# Patient Record
Sex: FEMALE | Race: WHITE | NOT HISPANIC OR LATINO | URBAN - METROPOLITAN AREA
[De-identification: names, ages, dates, MRNs, and addresses within clinical notes are randomized per-mention and may not be internally consistent; named-entity substitution may affect disease eponyms.]

---

## 2020-02-01 ENCOUNTER — EMERGENCY (EMERGENCY)
Facility: HOSPITAL | Age: 85
LOS: 0 days | Discharge: HOME | End: 2020-02-01
Attending: EMERGENCY MEDICINE
Payer: MEDICARE

## 2020-02-01 VITALS
TEMPERATURE: 98 F | DIASTOLIC BLOOD PRESSURE: 73 MMHG | OXYGEN SATURATION: 98 % | WEIGHT: 110.01 LBS | HEIGHT: 62 IN | RESPIRATION RATE: 18 BRPM | HEART RATE: 60 BPM | SYSTOLIC BLOOD PRESSURE: 154 MMHG

## 2020-02-01 VITALS — HEART RATE: 62 BPM | SYSTOLIC BLOOD PRESSURE: 125 MMHG | DIASTOLIC BLOOD PRESSURE: 97 MMHG

## 2020-02-01 DIAGNOSIS — Z88.0 ALLERGY STATUS TO PENICILLIN: ICD-10-CM

## 2020-02-01 DIAGNOSIS — R19.7 DIARRHEA, UNSPECIFIED: ICD-10-CM

## 2020-02-01 DIAGNOSIS — Z88.8 ALLERGY STATUS TO OTHER DRUGS, MEDICAMENTS AND BIOLOGICAL SUBSTANCES STATUS: ICD-10-CM

## 2020-02-01 DIAGNOSIS — R11.10 VOMITING, UNSPECIFIED: ICD-10-CM

## 2020-02-01 DIAGNOSIS — E86.0 DEHYDRATION: ICD-10-CM

## 2020-02-01 DIAGNOSIS — Z95.0 PRESENCE OF CARDIAC PACEMAKER: ICD-10-CM

## 2020-02-01 DIAGNOSIS — Z88.5 ALLERGY STATUS TO NARCOTIC AGENT: ICD-10-CM

## 2020-02-01 DIAGNOSIS — I10 ESSENTIAL (PRIMARY) HYPERTENSION: ICD-10-CM

## 2020-02-01 DIAGNOSIS — R11.2 NAUSEA WITH VOMITING, UNSPECIFIED: ICD-10-CM

## 2020-02-01 LAB
ALBUMIN SERPL ELPH-MCNC: 3.2 G/DL — LOW (ref 3.3–5)
ALP SERPL-CCNC: 93 U/L — SIGNIFICANT CHANGE UP (ref 40–120)
ALT FLD-CCNC: 20 U/L — SIGNIFICANT CHANGE UP (ref 12–78)
ANION GAP SERPL CALC-SCNC: 8 MMOL/L — SIGNIFICANT CHANGE UP (ref 5–17)
APPEARANCE UR: CLEAR — SIGNIFICANT CHANGE UP
AST SERPL-CCNC: 22 U/L — SIGNIFICANT CHANGE UP (ref 15–37)
BASOPHILS # BLD AUTO: 0.03 K/UL — SIGNIFICANT CHANGE UP (ref 0–0.2)
BASOPHILS NFR BLD AUTO: 0.3 % — SIGNIFICANT CHANGE UP (ref 0–2)
BILIRUB SERPL-MCNC: 0.3 MG/DL — SIGNIFICANT CHANGE UP (ref 0.2–1.2)
BILIRUB UR-MCNC: NEGATIVE — SIGNIFICANT CHANGE UP
BUN SERPL-MCNC: 23 MG/DL — SIGNIFICANT CHANGE UP (ref 7–23)
CALCIUM SERPL-MCNC: 8.8 MG/DL — SIGNIFICANT CHANGE UP (ref 8.5–10.1)
CHLORIDE SERPL-SCNC: 107 MMOL/L — SIGNIFICANT CHANGE UP (ref 96–108)
CO2 SERPL-SCNC: 24 MMOL/L — SIGNIFICANT CHANGE UP (ref 22–31)
COLOR SPEC: YELLOW — SIGNIFICANT CHANGE UP
CREAT SERPL-MCNC: 0.95 MG/DL — SIGNIFICANT CHANGE UP (ref 0.5–1.3)
DIFF PNL FLD: NEGATIVE — SIGNIFICANT CHANGE UP
EOSINOPHIL # BLD AUTO: 0.19 K/UL — SIGNIFICANT CHANGE UP (ref 0–0.5)
EOSINOPHIL NFR BLD AUTO: 1.7 % — SIGNIFICANT CHANGE UP (ref 0–6)
GLUCOSE SERPL-MCNC: 107 MG/DL — HIGH (ref 70–99)
GLUCOSE UR QL: NEGATIVE MG/DL — SIGNIFICANT CHANGE UP
HCT VFR BLD CALC: 42.2 % — SIGNIFICANT CHANGE UP (ref 34.5–45)
HGB BLD-MCNC: 13.2 G/DL — SIGNIFICANT CHANGE UP (ref 11.5–15.5)
IMM GRANULOCYTES NFR BLD AUTO: 0.4 % — SIGNIFICANT CHANGE UP (ref 0–1.5)
KETONES UR-MCNC: NEGATIVE — SIGNIFICANT CHANGE UP
LEUKOCYTE ESTERASE UR-ACNC: ABNORMAL
LIDOCAIN IGE QN: 218 U/L — SIGNIFICANT CHANGE UP (ref 73–393)
LYMPHOCYTES # BLD AUTO: 0.5 K/UL — LOW (ref 1–3.3)
LYMPHOCYTES # BLD AUTO: 4.6 % — LOW (ref 13–44)
MAGNESIUM SERPL-MCNC: 1.8 MG/DL — SIGNIFICANT CHANGE UP (ref 1.6–2.6)
MCHC RBC-ENTMCNC: 25 PG — LOW (ref 27–34)
MCHC RBC-ENTMCNC: 31.3 GM/DL — LOW (ref 32–36)
MCV RBC AUTO: 79.9 FL — LOW (ref 80–100)
MONOCYTES # BLD AUTO: 0.59 K/UL — SIGNIFICANT CHANGE UP (ref 0–0.9)
MONOCYTES NFR BLD AUTO: 5.4 % — SIGNIFICANT CHANGE UP (ref 2–14)
NEUTROPHILS # BLD AUTO: 9.6 K/UL — HIGH (ref 1.8–7.4)
NEUTROPHILS NFR BLD AUTO: 87.6 % — HIGH (ref 43–77)
NITRITE UR-MCNC: NEGATIVE — SIGNIFICANT CHANGE UP
PH UR: 6.5 — SIGNIFICANT CHANGE UP (ref 5–8)
PLATELET # BLD AUTO: 280 K/UL — SIGNIFICANT CHANGE UP (ref 150–400)
POTASSIUM SERPL-MCNC: 4.3 MMOL/L — SIGNIFICANT CHANGE UP (ref 3.5–5.3)
POTASSIUM SERPL-SCNC: 4.3 MMOL/L — SIGNIFICANT CHANGE UP (ref 3.5–5.3)
PROT SERPL-MCNC: 6.5 GM/DL — SIGNIFICANT CHANGE UP (ref 6–8.3)
PROT UR-MCNC: NEGATIVE MG/DL — SIGNIFICANT CHANGE UP
RBC # BLD: 5.28 M/UL — HIGH (ref 3.8–5.2)
RBC # FLD: 14.8 % — HIGH (ref 10.3–14.5)
SODIUM SERPL-SCNC: 139 MMOL/L — SIGNIFICANT CHANGE UP (ref 135–145)
SP GR SPEC: 1 — LOW (ref 1.01–1.02)
UROBILINOGEN FLD QL: NEGATIVE MG/DL — SIGNIFICANT CHANGE UP
WBC # BLD: 10.95 K/UL — HIGH (ref 3.8–10.5)
WBC # FLD AUTO: 10.95 K/UL — HIGH (ref 3.8–10.5)

## 2020-02-01 PROCEDURE — 93005 ELECTROCARDIOGRAM TRACING: CPT

## 2020-02-01 PROCEDURE — 96374 THER/PROPH/DIAG INJ IV PUSH: CPT | Mod: XU

## 2020-02-01 PROCEDURE — 85025 COMPLETE CBC W/AUTO DIFF WBC: CPT

## 2020-02-01 PROCEDURE — 80053 COMPREHEN METABOLIC PANEL: CPT

## 2020-02-01 PROCEDURE — 36415 COLL VENOUS BLD VENIPUNCTURE: CPT

## 2020-02-01 PROCEDURE — 83690 ASSAY OF LIPASE: CPT

## 2020-02-01 PROCEDURE — 99284 EMERGENCY DEPT VISIT MOD MDM: CPT | Mod: 25

## 2020-02-01 PROCEDURE — 96375 TX/PRO/DX INJ NEW DRUG ADDON: CPT | Mod: XU

## 2020-02-01 PROCEDURE — 83735 ASSAY OF MAGNESIUM: CPT

## 2020-02-01 PROCEDURE — 74177 CT ABD & PELVIS W/CONTRAST: CPT | Mod: 26

## 2020-02-01 PROCEDURE — 93010 ELECTROCARDIOGRAM REPORT: CPT

## 2020-02-01 PROCEDURE — 81001 URINALYSIS AUTO W/SCOPE: CPT

## 2020-02-01 PROCEDURE — 87086 URINE CULTURE/COLONY COUNT: CPT

## 2020-02-01 PROCEDURE — 96361 HYDRATE IV INFUSION ADD-ON: CPT | Mod: XU

## 2020-02-01 PROCEDURE — 99285 EMERGENCY DEPT VISIT HI MDM: CPT

## 2020-02-01 PROCEDURE — 74177 CT ABD & PELVIS W/CONTRAST: CPT

## 2020-02-01 RX ORDER — SODIUM CHLORIDE 9 MG/ML
500 INJECTION INTRAMUSCULAR; INTRAVENOUS; SUBCUTANEOUS ONCE
Refills: 0 | Status: COMPLETED | OUTPATIENT
Start: 2020-02-01 | End: 2020-02-01

## 2020-02-01 RX ORDER — SODIUM CHLORIDE 9 MG/ML
1000 INJECTION INTRAMUSCULAR; INTRAVENOUS; SUBCUTANEOUS ONCE
Refills: 0 | Status: COMPLETED | OUTPATIENT
Start: 2020-02-01 | End: 2020-02-01

## 2020-02-01 RX ORDER — ONDANSETRON 8 MG/1
4 TABLET, FILM COATED ORAL ONCE
Refills: 0 | Status: COMPLETED | OUTPATIENT
Start: 2020-02-01 | End: 2020-02-01

## 2020-02-01 RX ADMIN — ONDANSETRON 4 MILLIGRAM(S): 8 TABLET, FILM COATED ORAL at 11:35

## 2020-02-01 RX ADMIN — SODIUM CHLORIDE 500 MILLILITER(S): 9 INJECTION INTRAMUSCULAR; INTRAVENOUS; SUBCUTANEOUS at 12:30

## 2020-02-01 RX ADMIN — ONDANSETRON 4 MILLIGRAM(S): 8 TABLET, FILM COATED ORAL at 12:30

## 2020-02-01 RX ADMIN — SODIUM CHLORIDE 500 MILLILITER(S): 9 INJECTION INTRAMUSCULAR; INTRAVENOUS; SUBCUTANEOUS at 13:48

## 2020-02-01 RX ADMIN — SODIUM CHLORIDE 2000 MILLILITER(S): 9 INJECTION INTRAMUSCULAR; INTRAVENOUS; SUBCUTANEOUS at 09:13

## 2020-02-01 RX ADMIN — SODIUM CHLORIDE 1000 MILLILITER(S): 9 INJECTION INTRAMUSCULAR; INTRAVENOUS; SUBCUTANEOUS at 13:48

## 2020-02-01 NOTE — ED PROVIDER NOTE - CLINICAL SUMMARY MEDICAL DECISION MAKING FREE TEXT BOX
will recommend antiemetics (pt refusing at this time), IV fluids. check labs, r/o electrolyte abnormality. reassess PO challenge.

## 2020-02-01 NOTE — ED PROVIDER NOTE - PATIENT PORTAL LINK FT
You can access the FollowMyHealth Patient Portal offered by [facility] by registering at the following website: [email or web address]. By joining [facility]’s FollowMyHealth portal, you will also be able to view your health information using other applications (apps) compatible with our system.

## 2020-02-01 NOTE — ED ADULT NURSE REASSESSMENT NOTE - NS ED NURSE REASSESS COMMENT FT1
pt when assisted with the bed pan, asked if she was going upstairs soon. pt informed she was being discharged back to [facility]. pt became tearful and stated she did not want to go back there because they were not going to take care of her. Dr. Viramontes made aware and went to speak with pt. Keren Hoffman Supervisor RN aware. Charge RN Dominique aware. Transport ambulance canceled until further notice.

## 2020-02-01 NOTE — ED ADULT NURSE NOTE - CHPI ED NUR SYMPTOMS NEG
no diarrhea/no abdominal distension/no blood in stool/no burning urination/no chills/no dysuria/no hematuria/no fever

## 2020-02-01 NOTE — ED PROVIDER NOTE - PMH
OFFICE VISIT      Patient: Shayne Fontana Date of Service: 3/12/2019   : 1952 MRN: [id number]     SUBJECTIVE:     Chief Complaint   Patient presents with   • Diabetes   • Hypertension   • Coronary Artery Disease   • Hyperlipidemia   • Other     Depression   • Other     Mammogram Due       HISTORY OF PRESENT ILLNESS:  Shayne Fontana is a 66 year old female who presents today for follow up on her CAD, DM with cataracts and PVD, HTN and morbid obesity. Pt ia taking her meds and watching her diet. She is regularly seeing her eye doctor.       PAST MEDICAL HISTORY:  Past Medical History:   Diagnosis Date   • Advance directive in chart    • CAD (coronary artery disease)    • Chronic kidney disease, stage II (mild)    • Depression    • DM (diabetes mellitus) (CMS/HCC)    • HTN (hypertension)    • Hyperlipidemia    • Morbid obesity (CMS/HCC)    • OA (osteoarthritis) of knee        MEDICATIONS:  Current Outpatient Medications   Medication Sig   • aspirin 81 MG chewable tablet Chew 81 mg by mouth daily.   • atorvastatin (LIPITOR) 40 MG tablet Take 40 mg by mouth daily.   • clopidogrel (PLAVIX) 75 MG tablet Take 75 mg by mouth daily.   • furosemide (LASIX) 40 MG tablet Take 1 tablet by mouth daily.   • ACCU-CHEK FASTCLIX LANCETS Misc AS DIRECTED 1 TO 2 TIMES A DAY   • metformin (GLUCOPHAGE-XR) 500 MG 24 hr tablet TAKE 1 TABLET BY MOUTH DAILY WITH MORNING MEAL   • traMADol (ULTRAM) 50 MG tablet Take  mg by mouth.   • metoPROLOL succinate (TOPROL-XL) 50 MG 24 hr tablet Take 50 mg by mouth.   • meclizine HCl (ANTIVERT) 25 MG tablet    • docusate sodium (COLACE) 50 MG capsule Take 50 mg by mouth.   • amLODIPine (NORVASC) 10 MG tablet Take 10 mg by mouth.   • lidocaine-prilocaine (EMLA) cream    • potassium chloride (KLOR-CON M) 20 MEQ elicia ER tablet    • blood glucose (ACCU-CHEK SMARTVIEW) test strip USE TO TEST TWICE DAILY   • ASPIRIN LOW DOSE 81 MG EC tablet TAKE 1 TABLET BY MOUTH DAILY   • enalapril (VASOTEC)  20 MG tablet TAKE 1 TABLET BY MOUTH EVERY MORNING   • escitalopram (LEXAPRO) 10 MG tablet      No current facility-administered medications for this visit.        ALLERGIES:  ALLERGIES:   Allergen Reactions   • Enalapril SWELLING     Lip swelling, no tongue or face swelling 18.  Presumed from enalapril.        PAST SURGICAL HISTORY:  Past Surgical History:   Procedure Laterality Date   •  section, classic     • Cholecystectomy     • Hysterectomy     • Tubal ligation         FAMILY HISTORY:  Family History   Problem Relation Age of Onset   • Hypertension Father        SOCIAL HISTORY:  Social History     Tobacco Use   • Smoking status: Former Smoker     Last attempt to quit: 2012     Years since quittin.8   • Smokeless tobacco: Never Used   Substance Use Topics   • Alcohol use: No     Frequency: Never   • Drug use: No       Review of Systems   All other systems reviewed and are negative.        OBJECTIVE:     Physical Exam   Constitutional: She is oriented to person, place, and time. She appears well-developed and well-nourished.   HENT:   Head: Normocephalic and atraumatic.   Eyes: Conjunctivae and EOM are normal. Pupils are equal, round, and reactive to light.   maranda cataracts with right more dense than left.    Neck: Normal range of motion. Neck supple.   Cardiovascular: Normal rate, regular rhythm, normal heart sounds and intact distal pulses.   Pulmonary/Chest: Effort normal and breath sounds normal.   Abdominal: Soft. Bowel sounds are normal.   Musculoskeletal: Normal range of motion.   Neurological: She is alert and oriented to person, place, and time. She has normal reflexes.   Skin: Skin is warm and dry.   Psychiatric: She has a normal mood and affect. Her behavior is normal. Judgment and thought content normal.   Nursing note and vitals reviewed.      Visit Vitals  /68 (BP Location: [facility], Patient Position: Sitting, Cuff Size: Large Adult)   Pulse 66   Temp 98.3 °F (36.8 °C)   Resp 18    Ht 5' 3\" (1.6 m)   Wt (!) 139.3 kg (306 lb 15.9 oz)   SpO2 96%   BMI 54.38 kg/m²       DIAGNOSTIC STUDIES:   LAB RESULTS:  CHOLESTEROL   Date Value Ref Range Status   11/10/2017 191 <200 mg/dl Final     Comment:        Desirable            <200  Borderline High      200 to 239  High                 >=240        05/05/2017 117 <200 mg/dl Final     Comment:        Desirable            <200  Borderline High      200 to 239  High                 >=240        03/08/2016 223 (H) 100 - 200 mg/dl Final     Comment:        Desirable                <200  Borderline High          200 to 239  High                     >=240        05/28/2015 196 100 - 200 mg/dl Final     Comment:        Desirable                <200  Borderline High          200 to 239  High                     >=240        06/20/2014 212 (H) 100 - 200 mg/dl Final     Comment:        Desirable                <200  Borderline High          200 to 239  High                     >=240        02/13/2014 240 (H) 100 - 200 mg/dl Final     Comment:        Desirable                <200  Borderline High          200 to 239  High                     >=240          HDL   Date Value Ref Range Status   11/10/2017 56 >49 mg/dl Final     Comment:     Low            <40  Borderline Low 40 to 49  Near Optimal   50 to 59  Optimal        >=60     05/05/2017 44 (L) >59 mg/dl Final     Comment:     Low            <40  Borderline Low 40 to 49  Near Optimal   50 to 59  Optimal        >=60     03/08/2016 47 >39 mg/dl Final     Comment:     Interpretive Data: Low <40, Borderline Low 40 to 49. Near Optimal 50 to 59, Optimal >=60   05/28/2015 46 >39 mg/dl Final     Comment:     Interpretive Data: Low <40, Borderline Low 40 to 49. Near Optimal 50 to 59, Optimal >=60   06/20/2014 44 >39 mg/dl Final     Comment:     Interpretive Data: Low <40, Borderline Low 40 to 49. Near Optimal 50 to 59, Optimal >=60   02/13/2014 42 >39 mg/dl Final     Comment:     Interpretive Data: Low <40, Borderline  Low 40 to 49. Near Optimal 50 to 59, Optimal >=60     CALCULATED LDL   Date Value Ref Range Status   11/10/2017 114 <130 mg/dl Final     Comment:     OPTIMAL               <100  NEAR OPTIMAL          100-129  BORDERLINE HIGH       130-159  HIGH                  160-189  VERY HIGH             >=190     05/05/2017 60 <130 mg/dl Final     Comment:     OPTIMAL               <100  NEAR OPTIMAL          100-129  BORDERLINE HIGH       130-159  HIGH                  160-189  VERY HIGH             >=190     03/08/2016 147 (H) <130 mg/dl Final     Comment:        Optimal                  <100  Near Optimal             100 to 129  Borderline High          130 to 159  High                     160 to 189  Very High                >=190        05/28/2015 126 <130 mg/dl Final     Comment:        Optimal                  <100  Near Optimal             100 to 129  Borderline High          130 to 159  High                     160 to 189  Very High                >=190        06/20/2014 141 (H) <130 mg/dl Final     Comment:        Optimal                  <100  Near Optimal             100 to 129  Borderline High          130 to 159  High                     160 to 189  Very High                >=190        02/13/2014 167 (H) <130 mg/dl Final     Comment:        Optimal                  <100  Near Optimal             100 to 129  Borderline High          130 to 159  High                     160 to 189  Very High                >=190          TRIGLYCERIDE   Date Value Ref Range Status   11/10/2017 104 <150 mg/dl Final     Comment:     Normal                   <150  Borderline High          150 to 199  High                     200 to 499  Very High                >=500     05/05/2017 65 <150 mg/dl Final     Comment:     Normal                   <150  Borderline High          150 to 199  High                     200 to 499  Very High                >=500     03/08/2016 147 <150 mg/dl Final     Comment:     Normal                    <150  Borderline High          150 to 199  High                     200 to 499  Very High                >=500     05/28/2015 122 <150 mg/dl Final     Comment:     Normal                   <150  Borderline High          150 to 199  High                     200 to 499  Very High                >=500     06/20/2014 134 <150 mg/dl Final     Comment:     Normal                   <150  Borderline High          150 to 199  High                     200 to 499  Very High                >=500     02/13/2014 157 (H) <150 mg/dl Final     Comment:     Normal                   <150  Borderline High          150 to 199  High                     200 to 499  Very High                >=500       Hemoglobin A1C   Date Value Ref Range Status   11/10/2017 6.4 (H) 4.5 - 5.6 % Final     Comment:        ----DIABETIC SCREENING---  NON DIABETIC                 <5.7%  INCREASED RISK                5.7-6.4%  DIAGNOSTIC FOR DIABETES      >6.4%     ----DIABETIC CONTROL---     A1C%           eAG mg/dL  6.0            126  6.5            140  7.0            154  7.5            169  8.0            183  8.5            197  9.0            212  9.5            226  10.0           240     05/05/2017 6.6 (H) 4.5 - 5.6 % Final     Comment:        ----DIABETIC SCREENING---  NON DIABETIC                 <5.7%  INCREASED RISK                5.7-6.4%  DIAGNOSTIC FOR DIABETES      >6.4%     ----DIABETIC CONTROL---     A1C%           eAG mg/dL  6.0            126  6.5            140  7.0            154  7.5            169  8.0            183  8.5            197  9.0            212  9.5            226  10.0           240         Lab Results Reviewed    Assessment AND PLAN:   This is a 66 year old year-old female who presents with the following.    1. Special screening examination for neoplasm of breast    2. Atherosclerosis of native coronary artery of native heart without angina pectoris    3. Age-related cataract of both eyes, unspecified age-related cataract  type    4. Essential hypertension    5. Morbid obesity (CMS/HCC)    6. Type 2 diabetes mellitus with diabetic peripheral angiopathy without gangrene, without long-term current use of insulin (CMS/[facility])    Pt counseled on her CAD, HTN and DM with PVD and cataracts and she is to continue present therapies. Pt counseled on her morbid obesity and she is to decrease her calories.     Return in about 3 months (around 6/12/2019) for labs today.    Instructions provided as documented in the AVS.          The patient indicated understanding of the diagnosis and agreed with the plan of care.           HTN (hypertension)    Pacemaker

## 2020-02-01 NOTE — ED ADULT NURSE REASSESSMENT NOTE - NS ED NURSE REASSESS COMMENT FT1
After speaking w/ pt and having pt speak w/ RN supervisors Javy, As well as Dr. Viramontes, pt is agreeable [facility]. [facility] denies any mal treatment and states she likes living there. Transport being rearranged. Pt awaiting p/up. Will continue to monitor and reassess pt.

## 2020-02-01 NOTE — ED ADULT NURSE NOTE - OBJECTIVE STATEMENT
pt is a 89 y/o female w/ pmhx of HTN, falls and left hip fracture s/p fall in Aug 2019, presenting to ED for eval of abd pain and nausea since last night worsening w/ movement and car ride. pt states nausea is intermittent and pain is controlled by lifting her arms up and flexing her wrist. pt denies chest pain, dizziness, palpitations, weakness, abd pain, dysuria or fever.

## 2020-02-02 LAB
CULTURE RESULTS: SIGNIFICANT CHANGE UP
SPECIMEN SOURCE: SIGNIFICANT CHANGE UP

## 2020-03-13 ENCOUNTER — EMERGENCY (EMERGENCY)
Facility: HOSPITAL | Age: 85
LOS: 0 days | Discharge: HOME | End: 2020-03-13
Attending: EMERGENCY MEDICINE
Payer: MEDICARE

## 2020-03-13 VITALS
HEIGHT: 62 IN | RESPIRATION RATE: 16 BRPM | DIASTOLIC BLOOD PRESSURE: 69 MMHG | OXYGEN SATURATION: 96 % | SYSTOLIC BLOOD PRESSURE: 179 MMHG | HEART RATE: 61 BPM | TEMPERATURE: 98 F | WEIGHT: 139.99 LBS

## 2020-03-13 VITALS
HEART RATE: 59 BPM | SYSTOLIC BLOOD PRESSURE: 150 MMHG | DIASTOLIC BLOOD PRESSURE: 55 MMHG | OXYGEN SATURATION: 98 % | RESPIRATION RATE: 16 BRPM

## 2020-03-13 DIAGNOSIS — Z88.5 ALLERGY STATUS TO NARCOTIC AGENT: ICD-10-CM
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
## 2020-03-13 DIAGNOSIS — I10 ESSENTIAL (PRIMARY) HYPERTENSION: ICD-10-CM
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
## 2020-03-13 DIAGNOSIS — Z88.8 ALLERGY STATUS TO OTHER DRUGS, MEDICAMENTS AND BIOLOGICAL SUBSTANCES STATUS: ICD-10-CM

## 2020-03-13 DIAGNOSIS — Z95.0 PRESENCE OF CARDIAC PACEMAKER: ICD-10-CM

## 2020-03-13 DIAGNOSIS — T26.02XA BURN OF LEFT EYELID AND PERIOCULAR AREA, INITIAL ENCOUNTER: ICD-10-CM

## 2020-03-13 DIAGNOSIS — Z88.0 ALLERGY STATUS TO PENICILLIN: ICD-10-CM

## 2020-03-13 DIAGNOSIS — S05.92XA UNSPECIFIED INJURY OF LEFT EYE AND ORBIT, INITIAL ENCOUNTER: ICD-10-CM

## 2020-03-13 PROCEDURE — 99284 EMERGENCY DEPT VISIT MOD MDM: CPT

## 2020-03-13 PROCEDURE — 99283 EMERGENCY DEPT VISIT LOW MDM: CPT

## 2020-03-13 RX ORDER — ACETAMINOPHEN 500 MG
650 TABLET ORAL ONCE
Refills: 0 | Status: COMPLETED | OUTPATIENT
Start: 2020-03-13 | End: 2020-03-13

## 2020-03-13 RX ORDER — OFLOXACIN 0.3 %
1 DROPS OPHTHALMIC (EYE)
Qty: 1 | Refills: 0
Start: 2020-03-13 | End: 2020-03-22

## 2020-03-13 RX ORDER — ERYTHROMYCIN BASE 5 MG/GRAM
1 OINTMENT (GRAM) OPHTHALMIC (EYE)
Qty: 3.5 | Refills: 0
Start: 2020-03-13 | End: 2020-03-22

## 2020-03-13 RX ORDER — ERYTHROMYCIN BASE 5 MG/GRAM
1 OINTMENT (GRAM) OPHTHALMIC (EYE) ONCE
Refills: 0 | Status: COMPLETED | OUTPATIENT
Start: 2020-03-13 | End: 2020-03-13

## 2020-03-13 RX ADMIN — Medication 1 APPLICATION(S): at 11:54

## 2020-03-13 RX ADMIN — Medication 650 MILLIGRAM(S): at 13:59

## 2020-03-13 NOTE — ED STATDOCS - CARE PROVIDER_API CALL
Hitesh Marina)  Ophthalmology  [address] [place] [address], [address]  [place] [address]  Phone: [phone]  Fax: [phone]  Follow Up Time:

## 2020-03-13 NOTE — ED ADULT NURSE NOTE - OBJECTIVE STATEMENT
Patient presents to the ED c/o left eye injury. Pt states she was holding hot water and she dropped it and the water went up and hit her eye both outside and inside. Pt states it hurts to move the eye but her vision is normal.

## 2020-03-13 NOTE — ED ADULT TRIAGE NOTE - CHIEF COMPLAINT QUOTE
Pt. to the ED C/O Left Eye Redness and swelling- Pt. states she burn eye with hot water while heating water in microwave- Denies blurry vision at this time- Alert and oriented x 4- EMS reports given Tetracaine PTA

## 2020-03-13 NOTE — ED STATDOCS - NSFOLLOWUPINSTRUCTIONS_ED_ALL_ED_FT
[facility]    Burn Care, Adult  A burn is an injury to the skin or the tissues under the skin. There are three types of burns:  First degree. These burns may cause the skin to be red and slightly swollen.Second degree. These burns are very painful and cause the skin to be very red. The skin may also leak fluid, look shiny, and develop blisters.Third degree. These burns cause permanent damage. They either turn the skin white or black and make it look charred, dry, and leathery.Taking care of your burn properly can help to prevent pain and infection. It can also help the burn to heal more quickly.  What are the risks?  Complications from burns include:  Damage to the skin.Reduced blood flow near the injury.Dead tissue.Scarring.Problems with movement, if the burn happened near a joint or on the hands or feet.Severe burns can lead to problems that affect the whole body, such as:  Fluid loss.Less blood circulating in the body.Inability to maintain a normal core body temperature (thermoregulation).Infection.Shock.Problems breathing.How to care for a first-degree burn  Right after a burn:     Rinse or soak the burn under cool water until the pain stops. Do not put ice on your burn. This can cause more damage.Lightly cover the burn with a sterile cloth (dressing).Burn care     Follow instructions from your health care provider about:  How to clean and take care of the burn.When to change and remove the dressing.Check your burn every day for signs of infection. Check for:  More redness, swelling, or pain.Warmth.Pus or a bad smell.Medicine     Take over-the-counter and prescription medicines only as told by your health care provider.If you were prescribed antibiotic medicine, take or apply it as told by your health care provider. Do not stop using the antibiotic even if your condition improves.General instructions     To prevent infection, do not put butter, oil, or other home remedies on your burn.Do not rub your burn, even when you are cleaning it.Protect your burn from the sun.How to care for a second-degree burn  Right after a burn:     Rinse or soak the burn under cool water. Do this for several minutes. Do not put ice on your burn. This can cause more damage.Lightly cover the burn with a sterile cloth (dressing).Burn care     Raise (elevate) the injured area above the level of your heart while sitting or lying down.Follow instructions from your health care provider about:  How to clean and take care of the burn.When to change and remove the dressing.Check your burn every day for signs of infection. Check for:  More redness, swelling, or pain.Warmth.Pus or a bad smell.Medicine        Take over-the-counter and prescription medicines only as told by your health care provider.If you were prescribed antibiotic medicine, take or apply it as told by your health care provider. Do not stop using the antibiotic even if your condition improves.General instructions     To prevent infection:  Do not put butter, oil, or other home remedies on the burn.Do not scratch or pick at the burn.Do not break any blisters.Do not peel skin.Do not rub your burn, even when you are cleaning it.Protect your burn from the sun.How to care for a third-degree burn  Right after a burn:     Lightly cover the burn with gauze.Seek immediate medical attention.Burn care     Raise (elevate) the injured area above the level of your heart while sitting or lying down.Drink enough fluid to keep your urine clear or pale yellow.Rest as told by your health care provider. Do not participate in sports or other physical activities until your health care provider approves.Follow instructions from your health care provider about:  How to clean and take care of the burn.When to change and remove the dressing.Check your burn every day for signs of infection. Check for:  More redness, swelling, or pain.Warmth.Pus or a bad smell.Medicine     Take over-the-counter and prescription medicines only as told by your health care provider.If you were prescribed antibiotic medicine, take or apply it as told by your health care provider. Do not stop using the antibiotic even if your condition improves.General instructions     To prevent infection:  Do not put butter, oil, or other home remedies on the burn.Do not scratch or pick at the burn.Do not break any blisters.Do not peel skin.Do not rub your burn, even when you are cleaning it.Protect your burn from the sun.Keep all follow-up visits as told by your health care provider. This is important.Contact a health care provider if:  Your condition does not improve.Your condition gets worse.You have a fever.Your burn changes in appearance or develops black or red spots.Your burn feels warm to the touch.Your pain is not controlled with medicine.Get help right away if:  You have redness, swelling, or pain at the site of the burn.You have fluid, blood, or pus coming from your burn.You have red streaks near the burn.You have severe pain.This information is not intended to replace advice given to you by your health care provider. Make sure you discuss any questions you have with your health care provider.      Rest. Apply Erythromycin eye ointment as directed and Ofloxacin eye drops as prescribed. Follow up with Opthamologist in 24-48 hrs.

## 2020-03-13 NOTE — ED STATDOCS - EYES, MLM
clear bilaterally.  Pupils equal, round, and reactive to light. +mild swelling to left eyelid superior, with faint errythema, no blistering, EOMI, gross vision intact

## 2020-03-13 NOTE — ED PROCEDURE NOTE - PROCEDURE ADDITIONAL DETAILS
Left Eye: Lid everted no FB, Tetracaine 0.5% 2 drops instill into left eye , Flouresein stain with diffuse dye uptake. under slit lamp. Alana CENTENO

## 2020-03-13 NOTE — ED STATDOCS - PROGRESS NOTE DETAILS
[facility] without [facility]. Left Eye + erythema around eye and upper lid with mild swelling. Tetracaine 0.5% 2 drops instlilled, Flouresein stain with Contacted 89 yr. old female BIBA presents to ED with burn to left eye. Reports she was holding a hot cup of water dropped it and it splashed into her left eye. Hit her eye outside and inside. Reports her vision is normal but hurts to move her eye. Wears glasses. Lens in eyes. Seen and examined by attending in intake. Plan: Eye exam and consult opthamology. Will F/U with results and re evaluate./ Alana NP [facility] unable to see [facility] without glasses. Left Eye + erythema around eye and upper lid with mild swelling. Tetracaine 0.5% 2 drops instlilled, Flouresein stain with + diffuse Contacted Opthamolgy at [facility] Dr. Gant and recomends Erythomycin Eye ointment 1 application 3 times a day and Ofloxacin eye drops 1 drop in left eye 4 times a day. Follow up with Opthamologist in 24-48 hrs. Alana CENTENO Pt with significant uptake to eye, but no e/o ulceration.  No e/o globe rupture.  Visual acuity at baseline.  Pt well appearing otherwise.  More likely superficial injury with low likelihood for deeper penetrating caustic injury given substance was water.  Able to get close ophtho f/u at [facility] which is sufficient given exam here.  D/c home with strict return precautions and prompt outpatient f/u.  Reinaldo Moya M.D.

## 2020-03-13 NOTE — ED STATDOCS - CLINICAL SUMMARY MEDICAL DECISION MAKING FREE TEXT BOX
Pt with scald injury to eye appears to be superficial thickness burn, has good sensation as mentioned no blisters will evaluation cornea to ensure no ulceration or abrasion if unremarkable will dc home. Pt with scald injury to L eye. Appears to be superficial thickness burn, has good sensation.  No blisters. will evaluate cornea to ensure no ulceration or abrasion if unremarkable will dc home.

## 2021-05-30 ENCOUNTER — TRANSCRIPTION ENCOUNTER (OUTPATIENT)
Age: 86
End: 2021-05-30

## 2021-05-30 ENCOUNTER — INPATIENT (INPATIENT)
Facility: HOSPITAL | Age: 86
LOS: 3 days | Discharge: NURSING, REHAB OR LONG-TERM CARE | DRG: 522 | End: 2021-06-03
Attending: ORTHOPAEDIC SURGERY | Admitting: ORTHOPAEDIC SURGERY
Payer: MEDICARE

## 2021-05-30 VITALS
SYSTOLIC BLOOD PRESSURE: 161 MMHG | HEART RATE: 22 BPM | WEIGHT: 132.28 LBS | HEIGHT: 62 IN | OXYGEN SATURATION: 96 % | TEMPERATURE: 98 F | RESPIRATION RATE: 60 BRPM | DIASTOLIC BLOOD PRESSURE: 68 MMHG

## 2021-05-30 DIAGNOSIS — S72.91XA UNSPECIFIED FRACTURE OF RIGHT FEMUR, INITIAL ENCOUNTER FOR CLOSED FRACTURE: ICD-10-CM

## 2021-05-30 DIAGNOSIS — Z95.0 PRESENCE OF CARDIAC PACEMAKER: ICD-10-CM

## 2021-05-30 DIAGNOSIS — Z90.12 ACQUIRED ABSENCE OF LEFT BREAST AND NIPPLE: Chronic | ICD-10-CM

## 2021-05-30 DIAGNOSIS — F03.90 UNSPECIFIED DEMENTIA WITHOUT BEHAVIORAL DISTURBANCE: ICD-10-CM

## 2021-05-30 PROBLEM — Z00.00 ENCOUNTER FOR PREVENTIVE HEALTH EXAMINATION: Status: ACTIVE | Noted: 2021-05-30

## 2021-05-30 LAB
ALBUMIN SERPL ELPH-MCNC: 3.7 G/DL — SIGNIFICANT CHANGE UP (ref 3.3–5)
ALP SERPL-CCNC: 79 U/L — SIGNIFICANT CHANGE UP (ref 40–120)
ALT FLD-CCNC: 17 U/L — SIGNIFICANT CHANGE UP (ref 10–45)
ANION GAP SERPL CALC-SCNC: 15 MMOL/L — SIGNIFICANT CHANGE UP (ref 5–17)
APTT BLD: 26.6 SEC — LOW (ref 27.5–35.5)
AST SERPL-CCNC: 20 U/L — SIGNIFICANT CHANGE UP (ref 10–40)
BASE EXCESS BLDV CALC-SCNC: 0.7 MMOL/L — SIGNIFICANT CHANGE UP (ref -2–2)
BASOPHILS # BLD AUTO: 0.05 K/UL — SIGNIFICANT CHANGE UP (ref 0–0.2)
BASOPHILS NFR BLD AUTO: 0.7 % — SIGNIFICANT CHANGE UP (ref 0–2)
BILIRUB SERPL-MCNC: 0.5 MG/DL — SIGNIFICANT CHANGE UP (ref 0.2–1.2)
BLD GP AB SCN SERPL QL: NEGATIVE — SIGNIFICANT CHANGE UP
BUN SERPL-MCNC: 24 MG/DL — HIGH (ref 7–23)
CA-I SERPL-SCNC: 1.13 MMOL/L — SIGNIFICANT CHANGE UP (ref 1.12–1.3)
CALCIUM SERPL-MCNC: 8.9 MG/DL — SIGNIFICANT CHANGE UP (ref 8.4–10.5)
CHLORIDE BLDV-SCNC: 110 MMOL/L — HIGH (ref 96–108)
CHLORIDE SERPL-SCNC: 105 MMOL/L — SIGNIFICANT CHANGE UP (ref 96–108)
CK SERPL-CCNC: 85 U/L — SIGNIFICANT CHANGE UP (ref 25–170)
CO2 BLDV-SCNC: 25 MMOL/L — SIGNIFICANT CHANGE UP (ref 22–30)
CO2 SERPL-SCNC: 19 MMOL/L — LOW (ref 22–31)
CREAT SERPL-MCNC: 0.92 MG/DL — SIGNIFICANT CHANGE UP (ref 0.5–1.3)
EOSINOPHIL # BLD AUTO: 0.16 K/UL — SIGNIFICANT CHANGE UP (ref 0–0.5)
EOSINOPHIL NFR BLD AUTO: 2.1 % — SIGNIFICANT CHANGE UP (ref 0–6)
GAS PNL BLDV: 136 MMOL/L — SIGNIFICANT CHANGE UP (ref 135–145)
GAS PNL BLDV: SIGNIFICANT CHANGE UP
GAS PNL BLDV: SIGNIFICANT CHANGE UP
GLUCOSE BLDV-MCNC: 122 MG/DL — HIGH (ref 70–99)
GLUCOSE SERPL-MCNC: 121 MG/DL — HIGH (ref 70–99)
HCO3 BLDV-SCNC: 24 MMOL/L — SIGNIFICANT CHANGE UP (ref 21–29)
HCT VFR BLD CALC: 42.7 % — SIGNIFICANT CHANGE UP (ref 34.5–45)
HCT VFR BLDA CALC: 46 % — SIGNIFICANT CHANGE UP (ref 39–50)
HGB BLD CALC-MCNC: 14.9 G/DL — SIGNIFICANT CHANGE UP (ref 11.5–15.5)
HGB BLD-MCNC: 14.1 G/DL — SIGNIFICANT CHANGE UP (ref 11.5–15.5)
IMM GRANULOCYTES NFR BLD AUTO: 0.7 % — SIGNIFICANT CHANGE UP (ref 0–1.5)
INR BLD: 1.12 RATIO — SIGNIFICANT CHANGE UP (ref 0.88–1.16)
LACTATE BLDV-MCNC: 2 MMOL/L — SIGNIFICANT CHANGE UP (ref 0.7–2)
LYMPHOCYTES # BLD AUTO: 0.68 K/UL — LOW (ref 1–3.3)
LYMPHOCYTES # BLD AUTO: 9 % — LOW (ref 13–44)
MCHC RBC-ENTMCNC: 27.8 PG — SIGNIFICANT CHANGE UP (ref 27–34)
MCHC RBC-ENTMCNC: 33 GM/DL — SIGNIFICANT CHANGE UP (ref 32–36)
MCV RBC AUTO: 84.2 FL — SIGNIFICANT CHANGE UP (ref 80–100)
MONOCYTES # BLD AUTO: 0.48 K/UL — SIGNIFICANT CHANGE UP (ref 0–0.9)
MONOCYTES NFR BLD AUTO: 6.3 % — SIGNIFICANT CHANGE UP (ref 2–14)
NEUTROPHILS # BLD AUTO: 6.16 K/UL — SIGNIFICANT CHANGE UP (ref 1.8–7.4)
NEUTROPHILS NFR BLD AUTO: 81.2 % — HIGH (ref 43–77)
NRBC # BLD: 0 /100 WBCS — SIGNIFICANT CHANGE UP (ref 0–0)
PCO2 BLDV: 36 MMHG — SIGNIFICANT CHANGE UP (ref 35–50)
PH BLDV: 7.44 — SIGNIFICANT CHANGE UP (ref 7.35–7.45)
PLATELET # BLD AUTO: 243 K/UL — SIGNIFICANT CHANGE UP (ref 150–400)
PO2 BLDV: 38 MMHG — SIGNIFICANT CHANGE UP (ref 25–45)
POTASSIUM BLDV-SCNC: 4.2 MMOL/L — SIGNIFICANT CHANGE UP (ref 3.5–5.3)
POTASSIUM SERPL-MCNC: 4.4 MMOL/L — SIGNIFICANT CHANGE UP (ref 3.5–5.3)
POTASSIUM SERPL-SCNC: 4.4 MMOL/L — SIGNIFICANT CHANGE UP (ref 3.5–5.3)
PROT SERPL-MCNC: 6.1 G/DL — SIGNIFICANT CHANGE UP (ref 6–8.3)
PROTHROM AB SERPL-ACNC: 13.4 SEC — SIGNIFICANT CHANGE UP (ref 10.6–13.6)
RBC # BLD: 5.07 M/UL — SIGNIFICANT CHANGE UP (ref 3.8–5.2)
RBC # FLD: 14.2 % — SIGNIFICANT CHANGE UP (ref 10.3–14.5)
RH IG SCN BLD-IMP: POSITIVE — SIGNIFICANT CHANGE UP
SAO2 % BLDV: 72 % — SIGNIFICANT CHANGE UP (ref 67–88)
SARS-COV-2 RNA SPEC QL NAA+PROBE: SIGNIFICANT CHANGE UP
SODIUM SERPL-SCNC: 139 MMOL/L — SIGNIFICANT CHANGE UP (ref 135–145)
WBC # BLD: 7.58 K/UL — SIGNIFICANT CHANGE UP (ref 3.8–10.5)
WBC # FLD AUTO: 7.58 K/UL — SIGNIFICANT CHANGE UP (ref 3.8–10.5)

## 2021-05-30 PROCEDURE — 76377 3D RENDER W/INTRP POSTPROCES: CPT | Mod: 26

## 2021-05-30 PROCEDURE — 72192 CT PELVIS W/O DYE: CPT | Mod: 26

## 2021-05-30 PROCEDURE — 99285 EMERGENCY DEPT VISIT HI MDM: CPT

## 2021-05-30 PROCEDURE — 93280 PM DEVICE PROGR EVAL DUAL: CPT | Mod: 26,GC

## 2021-05-30 PROCEDURE — 93010 ELECTROCARDIOGRAM REPORT: CPT

## 2021-05-30 PROCEDURE — 70450 CT HEAD/BRAIN W/O DYE: CPT | Mod: 26

## 2021-05-30 RX ORDER — FENTANYL CITRATE 50 UG/ML
50 INJECTION INTRAVENOUS ONCE
Refills: 0 | Status: DISCONTINUED | OUTPATIENT
Start: 2021-05-30 | End: 2021-05-30

## 2021-05-30 RX ORDER — TRAMADOL HYDROCHLORIDE 50 MG/1
50 TABLET ORAL EVERY 4 HOURS
Refills: 0 | Status: DISCONTINUED | OUTPATIENT
Start: 2021-05-30 | End: 2021-05-31

## 2021-05-30 RX ORDER — CARVEDILOL PHOSPHATE 80 MG/1
6.25 CAPSULE, EXTENDED RELEASE ORAL EVERY 12 HOURS
Refills: 0 | Status: DISCONTINUED | OUTPATIENT
Start: 2021-05-30 | End: 2021-05-31

## 2021-05-30 RX ORDER — SENNA PLUS 8.6 MG/1
2 TABLET ORAL AT BEDTIME
Refills: 0 | Status: DISCONTINUED | OUTPATIENT
Start: 2021-05-30 | End: 2021-05-31

## 2021-05-30 RX ORDER — LANOLIN ALCOHOL/MO/W.PET/CERES
3 CREAM (GRAM) TOPICAL AT BEDTIME
Refills: 0 | Status: DISCONTINUED | OUTPATIENT
Start: 2021-05-30 | End: 2021-05-31

## 2021-05-30 RX ORDER — TRAMADOL HYDROCHLORIDE 50 MG/1
25 TABLET ORAL EVERY 4 HOURS
Refills: 0 | Status: DISCONTINUED | OUTPATIENT
Start: 2021-05-30 | End: 2021-05-31

## 2021-05-30 RX ORDER — METOCLOPRAMIDE HCL 10 MG
5 TABLET ORAL EVERY 6 HOURS
Refills: 0 | Status: DISCONTINUED | OUTPATIENT
Start: 2021-05-30 | End: 2021-05-31

## 2021-05-30 RX ORDER — HEPARIN SODIUM 5000 [USP'U]/ML
5000 INJECTION INTRAVENOUS; SUBCUTANEOUS EVERY 8 HOURS
Refills: 0 | Status: COMPLETED | OUTPATIENT
Start: 2021-05-30 | End: 2021-05-30

## 2021-05-30 RX ORDER — SODIUM CHLORIDE 9 MG/ML
1000 INJECTION INTRAMUSCULAR; INTRAVENOUS; SUBCUTANEOUS
Refills: 0 | Status: DISCONTINUED | OUTPATIENT
Start: 2021-05-30 | End: 2021-05-31

## 2021-05-30 RX ORDER — ACETAMINOPHEN 500 MG
975 TABLET ORAL EVERY 8 HOURS
Refills: 0 | Status: DISCONTINUED | OUTPATIENT
Start: 2021-05-30 | End: 2021-05-31

## 2021-05-30 RX ADMIN — TRAMADOL HYDROCHLORIDE 50 MILLIGRAM(S): 50 TABLET ORAL at 23:46

## 2021-05-30 RX ADMIN — HEPARIN SODIUM 5000 UNIT(S): 5000 INJECTION INTRAVENOUS; SUBCUTANEOUS at 22:32

## 2021-05-30 RX ADMIN — Medication 975 MILLIGRAM(S): at 22:30

## 2021-05-30 RX ADMIN — Medication 975 MILLIGRAM(S): at 23:30

## 2021-05-30 RX ADMIN — CARVEDILOL PHOSPHATE 6.25 MILLIGRAM(S): 80 CAPSULE, EXTENDED RELEASE ORAL at 23:41

## 2021-05-30 RX ADMIN — Medication 3 MILLIGRAM(S): at 22:42

## 2021-05-30 RX ADMIN — SODIUM CHLORIDE 125 MILLILITER(S): 9 INJECTION INTRAMUSCULAR; INTRAVENOUS; SUBCUTANEOUS at 20:32

## 2021-05-30 NOTE — PATIENT PROFILE ADULT - DATE OF LAST VACCINATION
30-Apr-2021 Consent (Near Eyelid Margin)/Introductory Paragraph: The rationale for Mohs was explained to the patient and consent was obtained. The risks, benefits and alternatives to therapy were discussed in detail. Specifically, the risks of ectropion or eyelid deformity, infection, scarring, bleeding, prolonged wound healing, incomplete removal, allergy to anesthesia, nerve injury and recurrence were addressed. Prior to the procedure, the treatment site was clearly identified and confirmed by the patient. All components of Universal Protocol/PAUSE Rule completed.

## 2021-05-30 NOTE — CONSULT NOTE ADULT - ASSESSMENT
89 yo woman with a hx of dementia presents after a fall at home with a right hipo fx. Patient is scheduled for an ORIF of the right hip

## 2021-05-30 NOTE — CONSULT NOTE ADULT - PROBLEM SELECTOR RECOMMENDATION 9
No contraindication to scheduled procedure  Pain meds as needed  GI regimen to prevent constipation  NPO for procedure

## 2021-05-30 NOTE — ED ADULT NURSE REASSESSMENT NOTE - NS ED NURSE REASSESS COMMENT FT1
Pt resting in stretcher, a&ox2 with disorientation noted, nad, c/o R hip pain but refusing pain medication. Daughter at bedside. Pending transport for bed
Pt returned from Xray. Transport team at bedside for CT scan; however, Cardiology at bedside to interrogate. Transport team states they will return to  patient. Patient phone provided to patient.
Pt transported to radiology testing without informing primary nurse (writing). Cardiology consult at bedside to interrogate device. Pt still has CT and Xrays ordered. Pt not seen at CT scan nor in radiology hallway. COVID test pending, Abbott swab obtained from laboratory.
Received report from JONAH Salazar RN. Pt observed resting in stretcher. Pt states that she cannot have Peripheral access device in left arm. 18G to the left AC removed, catheter intact. Pt states that ice packs made pain worse. Multiple hot packs applied to patient's right hip, pt endorsing relief from pain. Pt offered pain medications, refusing at this time. Pt states she only takes Tylenol. Previous nurse JONAH Salazar stating that last dose of Tylenol at approximately noon, therefore pt unable to receive at this time, pt educated on Tylenol dosing times. Pt placed in position of comfort. Pt educated on call bell system and provided call bell. Bed in lowest position, wheels locked, appropriate side rails raised. Pt denies needs at this time. FALL RISKS IN PLACE AT THIS TIME.
Received report from break coverage nurse. Pt returned from imaging. Pt states that her pain is severe, medications ordered for pain, but pt repeatedly refusing pain medications. ED Attending Rui at bedside. Pt placed in position of comfort. Pt educated on call bell system and provided call bell. Bed in lowest position, wheels locked, appropriate side rails raised. Pt denies needs at this time.
After speaking to patient's family. Pt returned from imaging. Pt states that she wants to stay and have surgery. Pt states that she is "severely allergic to opium," and is afraid of "dying from an overdose." Pt placed in position of comfort. Pt educated on call bell system and provided call bell. Bed in lowest position, wheels locked, appropriate side rails raised. Pt denies needs at this time. FALL RISK PRECAUTIONS IN PLACE.

## 2021-05-30 NOTE — ED PROVIDER NOTE - ATTENDING CONTRIBUTION TO CARE
89F, pmh dementia, s/p ppm, presents s/p trip and fall at nursing home. per ems, pt on floor for prolonged period before being found by aides. pt complaining of severe pain to R hip. unable to move RLE 2/2 pain. denies headache, dizziness, n/v. denies head strike with fall. denies sx preceding fall.    PE: Mod distress 2/2 pain, NCAT, MMM, Trachea midline, Normal conjunctiva, lungs CTAB, S1/S2 RRR, Normal perfusion, 2+ radial pulses bilat, Abdomen Soft, NTND, No rebound/guarding, No LE edema. +TTP R hip, unable to range 2/2 pain, otherwise no appreciable bony or joint ttp bilat UE and LE. 1+ dp pulses bilat LE. Able to move toes bilat.1    Pt refusing pain meds other than tylenol (given by EMS). Concerning for R hip fracture. NV intact. C/w trip and fall. Check labs, including ck as may have been down for prolonged period. CTH. XRs. Plan to admit. - Jamir Parnell MD

## 2021-05-30 NOTE — ED ADULT NURSE NOTE - OBJECTIVE STATEMENT
Pt is a 89 yo F who was brought to the ED from [facility] c/o rt hip pain s/p fall. Pt states while walking from her bedroom and her bathroom, she tripped on the space between the carpet and the floor. Denies head inj/loc. States she was on the floor "for a couple of hours" before she was found. Pt states she is allergic to Morphine, refused Fentanyl, given Tylenol 1 gm po at 12:15 by EMS. Pt is a 89 yo F who was brought to the ED from [facility] c/o rt hip pain s/p fall. Pt states while walking from her bedroom to her bathroom, she tripped on the space between the bedroom carpet and the bathroom floor. Denies head inj/loc. States she was on the floor "for a couple of hours" before she was found. Pt states she is allergic to Morphine, refused Fentanyl, given Tylenol 1 gm po at 12:15 by EMS.

## 2021-05-30 NOTE — ED PROVIDER NOTE - NS ED ROS FT
Constitutional: No fever,   ENMT:  No neck pain  Cardiac:  No chest pain  Respiratory:  No cough  GI:  no vomiting   :  No dysuria  MS:  No back pain.  Neuro:  No headache  Skin:  No skin rash  Endocrine: No history of thyroid disease or diabetes.

## 2021-05-30 NOTE — ED ADULT NURSE NOTE - CHPI ED NUR SYMPTOMS NEG
no abrasion/no bleeding/no fever/no loss of consciousness/no numbness/no tingling/no vomiting/no weakness

## 2021-05-30 NOTE — ED PROVIDER NOTE - OBJECTIVE STATEMENT
89 y/o F dementia, htn p/w fall at nursing home x earlier today. as per patient, pt was walking and she tripped over carpet and fell onto right side. was down for multiple hours before being found by aides. patient does not endorse head trauma or LOC. refusing pain medication, pain is 10/10 over right hip, patient cannot move right hip  aaox2 at baseline, no cp, sob, fever, head trauma, LOC, blurry vision, vomiting, diarrhea, AC use

## 2021-05-30 NOTE — CONSULT NOTE ADULT - PROBLEM SELECTOR RECOMMENDATION 3
PPM interrogated by cardiology  Good battery life  Discussed with Pts daughter who is unsure why a PPM was placed

## 2021-05-30 NOTE — ED PROVIDER NOTE - PHYSICAL EXAMINATION
Vital signs reviewed  GENERAL: aaox2  HEAD: NCAT  EYES: Anicteric  ENT: MMM  NECK: Supple, non tender  RESPIRATORY: Normal respiratory effort.   CARDIOVASCULAR: Regular rate and rhythm  ABDOMEN: Soft. Nondistended. Nontender.  MUSCULOSKELETAL/EXTREMITIES: +ttp right hip +dp and at pulses. +right shortened extremity internally rotated.   SKIN:  Warm and dry  NEURO: AAOx2. No gross FND.

## 2021-05-30 NOTE — H&P ADULT - ASSESSMENT
89y Female community ambulator presents c/o RIGHT  hip pain and inability to ambulate sp mechanical fall. Denies HS/LOC. Denies numbness/tingling. Denies fever/chills. Denies pain/injury elsewhere. Community ambulator at baseline.    MEWS Score    Pacemaker    Hip fx    Breast cancer    H/O left mastectomy    FALL    [email or web address]      PAST MEDICAL & SURGICAL HISTORY:  Pacemaker    Hip fx  right    Breast cancer    H/O left mastectomy      MEDICATIONS  (STANDING):    Allergies    Cymbalta (Unknown)  Imodium (Unknown)  morphine (Unknown)  penicillin (Unknown)    Intolerances          Vital Signs Last 24 Hrs  T(C): 36.6 (05-30-21 @ 15:39), Max: 36.8 (05-30-21 @ 13:45)  T(F): 97.8 (05-30-21 @ 15:39), Max: 98.2 (05-30-21 @ 13:45)  HR: 63 (05-30-21 @ 17:05) (22 - 63)  BP: 192/64 (05-30-21 @ 17:05) (131/105 - 192/64)  BP(mean): 101 (05-30-21 @ 17:05) (95 - 106)  RR: 20 (05-30-21 @ 17:05) (18 - 60)  SpO2: 99% (05-30-21 @ 17:05) (94% - 99%)  Physical Exam  General: NAD, resting comfortably  RLE:   Skin intact  +TTP over hip  Limited ROM d/t pain, unable to SLR  +TA/EHL/FHL/GS  SILT L2-S1  DP/PT 2+  Compartments soft and compressible  Calves NTTP b/l    BLUE: No bony tenderness or deformity, skin intact  LLE: No bony tenderness or deformity, skin intact  Spine: No tenderness or stepoffs, skin intact                          14.1   7.58  )-----------( 243      ( 30 May 2021 14:11 )             42.7     30 May 2021 14:11    139    |  105    |  24     ----------------------------<  121    4.4     |  19     |  0.92     Ca    8.9        30 May 2021 14:11    TPro  6.1    /  Alb  3.7    /  TBili  0.5    /  DBili  x      /  AST  20     /  ALT  17     /  AlkPhos  79     30 May 2021 14:11    PT/INR - ( 30 May 2021 14:30 )   PT: 13.4 sec;   INR: 1.12 ratio         PTT - ( 30 May 2021 14:30 )  PTT:26.6 sec    Imaging  XR r Hip: Showing femoral neck fracture      A/P: 89y Female with R FN Fx  -Pain control  -NPO  -IVF while NPO  -NWB RLE, bedrest  -Hold DVT ppx  -FU labs/imaging  -Medical clearance/optimization for OR  -Discussed above with Ortho Attending   -OR for hemiarthroplasty

## 2021-05-30 NOTE — ED ADULT NURSE NOTE - NSIMPLEMENTINTERV_GEN_ALL_ED
Implemented All Universal Safety Interventions:  [place] to call system. Call bell, personal items and telephone within reach. Instruct patient to call for assistance. Room bathroom lighting operational. Non-slip footwear when patient is off stretcher. Physically safe environment: no spills, clutter or unnecessary equipment. Stretcher in lowest position, wheels locked, appropriate side rails in place.

## 2021-05-30 NOTE — ED ADULT NURSE NOTE - ED STAT RN HANDOFF DETAILS
Handoff report given to on coming nurse Susanne LOTT. Understands pmh, pt's allergy to "opiates," and plan of care for patient. Patient at CT radiology at time of report.

## 2021-05-31 DIAGNOSIS — S72.001A FRACTURE OF UNSPECIFIED PART OF NECK OF RIGHT FEMUR, INITIAL ENCOUNTER FOR CLOSED FRACTURE: ICD-10-CM

## 2021-05-31 LAB
ANION GAP SERPL CALC-SCNC: 10 MMOL/L — SIGNIFICANT CHANGE UP (ref 5–17)
ANION GAP SERPL CALC-SCNC: 13 MMOL/L — SIGNIFICANT CHANGE UP (ref 5–17)
ANION GAP SERPL CALC-SCNC: 14 MMOL/L — SIGNIFICANT CHANGE UP (ref 5–17)
APTT BLD: 28.9 SEC — SIGNIFICANT CHANGE UP (ref 27.5–35.5)
BUN SERPL-MCNC: 20 MG/DL — SIGNIFICANT CHANGE UP (ref 7–23)
BUN SERPL-MCNC: 26 MG/DL — HIGH (ref 7–23)
BUN SERPL-MCNC: 27 MG/DL — HIGH (ref 7–23)
CALCIUM SERPL-MCNC: 6.6 MG/DL — LOW (ref 8.4–10.5)
CALCIUM SERPL-MCNC: 7.7 MG/DL — LOW (ref 8.4–10.5)
CALCIUM SERPL-MCNC: 8.1 MG/DL — LOW (ref 8.4–10.5)
CHLORIDE SERPL-SCNC: 110 MMOL/L — HIGH (ref 96–108)
CHLORIDE SERPL-SCNC: 111 MMOL/L — HIGH (ref 96–108)
CHLORIDE SERPL-SCNC: 116 MMOL/L — HIGH (ref 96–108)
CO2 SERPL-SCNC: 15 MMOL/L — LOW (ref 22–31)
CO2 SERPL-SCNC: 15 MMOL/L — LOW (ref 22–31)
CO2 SERPL-SCNC: 17 MMOL/L — LOW (ref 22–31)
COVID-19 SPIKE DOMAIN AB INTERP: NEGATIVE — SIGNIFICANT CHANGE UP
COVID-19 SPIKE DOMAIN ANTIBODY RESULT: 0.4 U/ML — SIGNIFICANT CHANGE UP
CREAT SERPL-MCNC: 0.77 MG/DL — SIGNIFICANT CHANGE UP (ref 0.5–1.3)
CREAT SERPL-MCNC: 0.87 MG/DL — SIGNIFICANT CHANGE UP (ref 0.5–1.3)
CREAT SERPL-MCNC: 0.93 MG/DL — SIGNIFICANT CHANGE UP (ref 0.5–1.3)
GLUCOSE SERPL-MCNC: 104 MG/DL — HIGH (ref 70–99)
GLUCOSE SERPL-MCNC: 148 MG/DL — HIGH (ref 70–99)
GLUCOSE SERPL-MCNC: 152 MG/DL — HIGH (ref 70–99)
HCT VFR BLD CALC: 30.1 % — LOW (ref 34.5–45)
HCT VFR BLD CALC: 38.3 % — SIGNIFICANT CHANGE UP (ref 34.5–45)
HGB BLD-MCNC: 12.2 G/DL — SIGNIFICANT CHANGE UP (ref 11.5–15.5)
HGB BLD-MCNC: 9.9 G/DL — LOW (ref 11.5–15.5)
INR BLD: 1.26 RATIO — HIGH (ref 0.88–1.16)
MCHC RBC-ENTMCNC: 27.4 PG — SIGNIFICANT CHANGE UP (ref 27–34)
MCHC RBC-ENTMCNC: 28.4 PG — SIGNIFICANT CHANGE UP (ref 27–34)
MCHC RBC-ENTMCNC: 31.9 GM/DL — LOW (ref 32–36)
MCHC RBC-ENTMCNC: 32.9 GM/DL — SIGNIFICANT CHANGE UP (ref 32–36)
MCV RBC AUTO: 85.9 FL — SIGNIFICANT CHANGE UP (ref 80–100)
MCV RBC AUTO: 86.2 FL — SIGNIFICANT CHANGE UP (ref 80–100)
NRBC # BLD: 0 /100 WBCS — SIGNIFICANT CHANGE UP (ref 0–0)
NRBC # BLD: 0 /100 WBCS — SIGNIFICANT CHANGE UP (ref 0–0)
PLATELET # BLD AUTO: 144 K/UL — LOW (ref 150–400)
PLATELET # BLD AUTO: 196 K/UL — SIGNIFICANT CHANGE UP (ref 150–400)
POTASSIUM SERPL-MCNC: 3.4 MMOL/L — LOW (ref 3.5–5.3)
POTASSIUM SERPL-MCNC: 5.6 MMOL/L — HIGH (ref 3.5–5.3)
POTASSIUM SERPL-MCNC: 6 MMOL/L — HIGH (ref 3.5–5.3)
POTASSIUM SERPL-SCNC: 3.4 MMOL/L — LOW (ref 3.5–5.3)
POTASSIUM SERPL-SCNC: 5.6 MMOL/L — HIGH (ref 3.5–5.3)
POTASSIUM SERPL-SCNC: 6 MMOL/L — HIGH (ref 3.5–5.3)
PROTHROM AB SERPL-ACNC: 15 SEC — HIGH (ref 10.6–13.6)
RBC # BLD: 3.49 M/UL — LOW (ref 3.8–5.2)
RBC # BLD: 4.46 M/UL — SIGNIFICANT CHANGE UP (ref 3.8–5.2)
RBC # FLD: 14 % — SIGNIFICANT CHANGE UP (ref 10.3–14.5)
RBC # FLD: 14.3 % — SIGNIFICANT CHANGE UP (ref 10.3–14.5)
SARS-COV-2 IGG+IGM SERPL QL IA: 0.4 U/ML — SIGNIFICANT CHANGE UP
SARS-COV-2 IGG+IGM SERPL QL IA: NEGATIVE — SIGNIFICANT CHANGE UP
SODIUM SERPL-SCNC: 139 MMOL/L — SIGNIFICANT CHANGE UP (ref 135–145)
SODIUM SERPL-SCNC: 139 MMOL/L — SIGNIFICANT CHANGE UP (ref 135–145)
SODIUM SERPL-SCNC: 143 MMOL/L — SIGNIFICANT CHANGE UP (ref 135–145)
WBC # BLD: 13.81 K/UL — HIGH (ref 3.8–10.5)
WBC # BLD: 7.49 K/UL — SIGNIFICANT CHANGE UP (ref 3.8–10.5)
WBC # FLD AUTO: 13.81 K/UL — HIGH (ref 3.8–10.5)
WBC # FLD AUTO: 7.49 K/UL — SIGNIFICANT CHANGE UP (ref 3.8–10.5)

## 2021-05-31 PROCEDURE — 93010 ELECTROCARDIOGRAM REPORT: CPT

## 2021-05-31 PROCEDURE — 73502 X-RAY EXAM HIP UNI 2-3 VIEWS: CPT | Mod: 26,RT

## 2021-05-31 RX ORDER — ONDANSETRON 8 MG/1
4 TABLET, FILM COATED ORAL ONCE
Refills: 0 | Status: DISCONTINUED | OUTPATIENT
Start: 2021-05-31 | End: 2021-05-31

## 2021-05-31 RX ORDER — SODIUM CHLORIDE 9 MG/ML
1000 INJECTION INTRAMUSCULAR; INTRAVENOUS; SUBCUTANEOUS
Refills: 0 | Status: DISCONTINUED | OUTPATIENT
Start: 2021-05-31 | End: 2021-05-31

## 2021-05-31 RX ORDER — QUETIAPINE FUMARATE 200 MG/1
25 TABLET, FILM COATED ORAL AT BEDTIME
Refills: 0 | Status: DISCONTINUED | OUTPATIENT
Start: 2021-05-31 | End: 2021-06-01

## 2021-05-31 RX ORDER — HALOPERIDOL DECANOATE 100 MG/ML
0.5 INJECTION INTRAMUSCULAR ONCE
Refills: 0 | Status: COMPLETED | OUTPATIENT
Start: 2021-05-31 | End: 2021-05-31

## 2021-05-31 RX ORDER — POLYETHYLENE GLYCOL 3350 17 G/17G
17 POWDER, FOR SOLUTION ORAL DAILY
Refills: 0 | Status: DISCONTINUED | OUTPATIENT
Start: 2021-05-31 | End: 2021-06-03

## 2021-05-31 RX ORDER — CEFAZOLIN SODIUM 1 G
2000 VIAL (EA) INJECTION EVERY 8 HOURS
Refills: 0 | Status: COMPLETED | OUTPATIENT
Start: 2021-05-31 | End: 2021-06-01

## 2021-05-31 RX ORDER — ONDANSETRON 8 MG/1
4 TABLET, FILM COATED ORAL EVERY 6 HOURS
Refills: 0 | Status: DISCONTINUED | OUTPATIENT
Start: 2021-05-31 | End: 2021-06-03

## 2021-05-31 RX ORDER — KETOROLAC TROMETHAMINE 30 MG/ML
15 SYRINGE (ML) INJECTION ONCE
Refills: 0 | Status: DISCONTINUED | OUTPATIENT
Start: 2021-05-31 | End: 2021-05-31

## 2021-05-31 RX ORDER — ACETAMINOPHEN 500 MG
975 TABLET ORAL EVERY 8 HOURS
Refills: 0 | Status: DISCONTINUED | OUTPATIENT
Start: 2021-05-31 | End: 2021-06-03

## 2021-05-31 RX ORDER — MAGNESIUM HYDROXIDE 400 MG/1
30 TABLET, CHEWABLE ORAL DAILY
Refills: 0 | Status: DISCONTINUED | OUTPATIENT
Start: 2021-05-31 | End: 2021-06-03

## 2021-05-31 RX ORDER — SENNA PLUS 8.6 MG/1
2 TABLET ORAL AT BEDTIME
Refills: 0 | Status: DISCONTINUED | OUTPATIENT
Start: 2021-05-31 | End: 2021-06-03

## 2021-05-31 RX ORDER — CHOLECALCIFEROL (VITAMIN D3) 125 MCG
2000 CAPSULE ORAL DAILY
Refills: 0 | Status: DISCONTINUED | OUTPATIENT
Start: 2021-05-31 | End: 2021-06-03

## 2021-05-31 RX ORDER — FENTANYL CITRATE 50 UG/ML
25 INJECTION INTRAVENOUS
Refills: 0 | Status: DISCONTINUED | OUTPATIENT
Start: 2021-05-31 | End: 2021-05-31

## 2021-05-31 RX ORDER — TRAMADOL HYDROCHLORIDE 50 MG/1
50 TABLET ORAL EVERY 4 HOURS
Refills: 0 | Status: DISCONTINUED | OUTPATIENT
Start: 2021-05-31 | End: 2021-06-03

## 2021-05-31 RX ORDER — POTASSIUM CHLORIDE 20 MEQ
20 PACKET (EA) ORAL
Refills: 0 | Status: COMPLETED | OUTPATIENT
Start: 2021-05-31 | End: 2021-05-31

## 2021-05-31 RX ORDER — LANOLIN ALCOHOL/MO/W.PET/CERES
3 CREAM (GRAM) TOPICAL AT BEDTIME
Refills: 0 | Status: DISCONTINUED | OUTPATIENT
Start: 2021-05-31 | End: 2021-06-03

## 2021-05-31 RX ORDER — SODIUM CHLORIDE 9 MG/ML
1000 INJECTION INTRAMUSCULAR; INTRAVENOUS; SUBCUTANEOUS
Refills: 0 | Status: DISCONTINUED | OUTPATIENT
Start: 2021-05-31 | End: 2021-06-03

## 2021-05-31 RX ORDER — SODIUM CHLORIDE 9 MG/ML
500 INJECTION INTRAMUSCULAR; INTRAVENOUS; SUBCUTANEOUS ONCE
Refills: 0 | Status: DISCONTINUED | OUTPATIENT
Start: 2021-05-31 | End: 2021-06-03

## 2021-05-31 RX ORDER — DIPHENHYDRAMINE HCL 50 MG
12.5 CAPSULE ORAL ONCE
Refills: 0 | Status: COMPLETED | OUTPATIENT
Start: 2021-05-31 | End: 2021-05-31

## 2021-05-31 RX ORDER — ASPIRIN/CALCIUM CARB/MAGNESIUM 324 MG
81 TABLET ORAL
Refills: 0 | Status: DISCONTINUED | OUTPATIENT
Start: 2021-06-01 | End: 2021-06-03

## 2021-05-31 RX ORDER — TRAMADOL HYDROCHLORIDE 50 MG/1
25 TABLET ORAL EVERY 4 HOURS
Refills: 0 | Status: DISCONTINUED | OUTPATIENT
Start: 2021-05-31 | End: 2021-06-03

## 2021-05-31 RX ORDER — FAMOTIDINE 10 MG/ML
20 INJECTION INTRAVENOUS ONCE
Refills: 0 | Status: COMPLETED | OUTPATIENT
Start: 2021-05-31 | End: 2021-05-31

## 2021-05-31 RX ADMIN — Medication 50 MILLIEQUIVALENT(S): at 08:00

## 2021-05-31 RX ADMIN — Medication 15 MILLIGRAM(S): at 01:20

## 2021-05-31 RX ADMIN — Medication 975 MILLIGRAM(S): at 21:01

## 2021-05-31 RX ADMIN — SODIUM CHLORIDE 75 MILLILITER(S): 9 INJECTION INTRAMUSCULAR; INTRAVENOUS; SUBCUTANEOUS at 18:01

## 2021-05-31 RX ADMIN — Medication 3 MILLIGRAM(S): at 21:15

## 2021-05-31 RX ADMIN — Medication 0.5 MILLIGRAM(S): at 22:44

## 2021-05-31 RX ADMIN — Medication 100 MILLIGRAM(S): at 22:44

## 2021-05-31 RX ADMIN — HALOPERIDOL DECANOATE 0.5 MILLIGRAM(S): 100 INJECTION INTRAMUSCULAR at 23:38

## 2021-05-31 RX ADMIN — Medication 12.5 MILLIGRAM(S): at 18:13

## 2021-05-31 RX ADMIN — Medication 50 MILLIEQUIVALENT(S): at 10:07

## 2021-05-31 RX ADMIN — FAMOTIDINE 20 MILLIGRAM(S): 10 INJECTION INTRAVENOUS at 18:10

## 2021-05-31 RX ADMIN — Medication 50 MILLIEQUIVALENT(S): at 05:35

## 2021-05-31 RX ADMIN — Medication 5 MILLIGRAM(S): at 07:52

## 2021-05-31 RX ADMIN — Medication 15 MILLIGRAM(S): at 00:47

## 2021-05-31 RX ADMIN — Medication 975 MILLIGRAM(S): at 05:36

## 2021-05-31 RX ADMIN — Medication 975 MILLIGRAM(S): at 22:30

## 2021-05-31 RX ADMIN — Medication 975 MILLIGRAM(S): at 06:20

## 2021-05-31 RX ADMIN — Medication 15 MILLIGRAM(S): at 23:16

## 2021-05-31 RX ADMIN — TRAMADOL HYDROCHLORIDE 50 MILLIGRAM(S): 50 TABLET ORAL at 00:47

## 2021-05-31 RX ADMIN — SODIUM CHLORIDE 75 MILLILITER(S): 9 INJECTION INTRAMUSCULAR; INTRAVENOUS; SUBCUTANEOUS at 17:23

## 2021-05-31 RX ADMIN — ONDANSETRON 4 MILLIGRAM(S): 8 TABLET, FILM COATED ORAL at 17:00

## 2021-05-31 NOTE — CHART NOTE - NSCHARTNOTEFT_GEN_A_CORE
ORTHOPEDIC SURGERY POST-OP CHECK    S: Patient seen and examined at bedside POD0 s/p R hip hemiarthroplasty. Pain well controlled with current regimen. Denies numbness/tingling in the extremity. Denies fever, chills, shortness of breath, and chest pain. Post-op potassium was elevated, so 500 cc bolus NS was given, ECG showed no T wave abnormalities, and repeat potassium was with normal limits    O: T(C): 36 (05-31-21 @ 19:00), Max: 37.1 (05-30-21 @ 23:56)  HR: 60 (05-31-21 @ 19:00) (60 - 80)  BP: 158/66 (05-31-21 @ 19:00) (134/62 - 181/87)  RR: 16 (05-31-21 @ 19:00) (12 - 18)  SpO2: 99% (05-31-21 @ 19:00) (93% - 100%)    Exam:   Gen: NAD, resting in bed  Resp: unlabored breathing  LE: dressing c/d/i        +EHL/FHL/TA/GS         SILT Vargas/Saph/Tib/DP/SP        2+ DP, cap refill <2 sec             05-30-21 @ 07:01  -  05-31-21 @ 07:00  --------------------------------------------------------  IN: 1100 mL / OUT: 950 mL / NET: 150 mL    05-31-21 @ 07:01  -  05-31-21 @ 19:54  --------------------------------------------------------  IN: 875 mL / OUT: 0 mL / NET: 875 mL          A/P: 89yFemale POD0 s/p R hip hemiarthroplasty, recovering well    - Pain control  - Elevate effected extremity  - Ice PRN  - WBAT/NWB EXT  - PT/OT  - OOB/AAT  - Regular diet  - Monitor I&Os

## 2021-05-31 NOTE — CONSULT NOTE ADULT - SUBJECTIVE AND OBJECTIVE BOX
CHIEF COMPLAINT:Patient is a 89y old  Female who presents with a chief complaint of right hip fx (30 May 2021 23:26)      HPI:  89 y/o F dementia, htn p/w fall at nursing home x earlier today. as per patient, pt was walking and she tripped over carpet and fell onto right side. was down for multiple hours before being found by aides. patient does not endorse head trauma or LOC. refusing pain medication, pain is 10/10 over right hip, patient cannot move right hip  aaox2 at baseline, no cp, sob, fever, head trauma, LOC, blurry vision, vomiting, diarrhea, AC use  pt with known hx of chb s/ ppm.    PAST MEDICAL & SURGICAL HISTORY:  Pacemaker    Hip fx  right    Breast cancer    H/O left mastectomy        MEDICATIONS  (STANDING):  acetaminophen   Tablet .. 975 milliGRAM(s) Oral every 8 hours  carvedilol 6.25 milliGRAM(s) Oral every 12 hours  potassium chloride  20 mEq/100 mL IVPB 20 milliEquivalent(s) IV Intermittent every 2 hours  senna 2 Tablet(s) Oral at bedtime  sodium chloride 0.9%. 1000 milliLiter(s) (125 mL/Hr) IV Continuous <Continuous>    MEDICATIONS  (PRN):  melatonin 3 milliGRAM(s) Oral at bedtime PRN Insomnia  metoclopramide Injectable 5 milliGRAM(s) IV Push every 6 hours PRN Nausea and/or Vomiting  traMADol 25 milliGRAM(s) Oral every 4 hours PRN Moderate Pain (4 - 6)  traMADol 50 milliGRAM(s) Oral every 4 hours PRN Severe Pain (7 - 10)      FAMILY HISTORY:      SOCIAL HISTORY:    [ ] Non-smoker  [ ] Smoker  [ ] Alcohol    Allergies    Cymbalta (Unknown)  Imodium (Unknown)  morphine (Unknown)  penicillin (Unknown)    Intolerances    	    REVIEW OF SYSTEMS:  CONSTITUTIONAL: No fever, weight loss, or fatigue  EYES: No eye pain, visual disturbances, or discharge  ENT:  No difficulty hearing, tinnitus, vertigo; No sinus or throat pain  NECK: No pain or stiffness  RESPIRATORY: No cough, wheezing, chills or hemoptysis; No Shortness of Breath  CARDIOVASCULAR: No chest pain, palpitations, passing out, dizziness, or leg swelling  GASTROINTESTINAL: No abdominal or epigastric pain. No nausea, vomiting, or hematemesis; No diarrhea or constipation. No melena or hematochezia.  GENITOURINARY: No dysuria, frequency, hematuria, or incontinence  NEUROLOGICAL: No headaches, memory loss, loss of strength, numbness, or tremors  SKIN: No itching, burning, rashes, or lesions   LYMPH Nodes: No enlarged glands  ENDOCRINE: No heat or cold intolerance; No hair loss  MUSCULOSKELETAL: No joint pain or swelling; No muscle, back, or extremity pain  PSYCHIATRIC: No depression, anxiety, mood swings, or difficulty sleeping  HEME/LYMPH: No easy bruising, or bleeding gums  ALLERGY AND IMMUNOLOGIC: No hives or eczema	    [ ] All others negative	  [x ] Unable to obtain    PHYSICAL EXAM:  T(C): 36.8 (21 @ 06:40), Max: 37.1 (21 @ 18:40)  HR: 80 (21 @ 06:40) (22 - 80)  BP: 135/60 (21 @ 06:40) (131/105 - 192/64)  RR: 18 (21 @ 06:40) (17 - 60)  SpO2: 95% (21 @ 06:40) (93% - 99%)  Wt(kg): --  I&O's Summary    30 May 2021 07:01  -  31 May 2021 07:00  --------------------------------------------------------  IN: 1100 mL / OUT: 950 mL / NET: 150 mL        Appearance: Normal	  HEENT:   Normal oral mucosa, PERRL, EOMI	  Lymphatic: No lymphadenopathy  Cardiovascular: Normal S1 S2, No JVD, + murmurs, No edema  Respiratory: Lungs clear to auscultation	  Psychiatry: A & O x 3, Mood & affect appropriate  Gastrointestinal:  Soft, Non-tender, + BS	  Skin: No rashes, No ecchymoses, No cyanosis	  Neurologic: Non-focal  Extremities: Normal range of motion, No clubbing, cyanosis or edema/ hip fx  Vascular: Peripheral pulses palpable 2+ bilaterally    TELEMETRY: 	    ECG:  	  RADIOLOGY:  OTHER: 	  	  LABS:	 	    CARDIAC MARKERS:  CARDIAC MARKERS ( 30 May 2021 14:11 )  x     / x     / 85 U/L / x     / x                                  9.9    7.49  )-----------( 144      ( 31 May 2021 04:17 )             30.1         143  |  116<H>  |  20  ----------------------------<  104<H>  3.4<L>   |  17<L>  |  0.77    Ca    6.6<L>      31 May 2021 04:17    TPro  6.1  /  Alb  3.7  /  TBili  0.5  /  DBili  x   /  AST  20  /  ALT  17  /  AlkPhos  79      proBNP:   Lipid Profile:   HgA1c:   TSH:   PT/INR - ( 31 May 2021 04:17 )   PT: 15.0 sec;   INR: 1.26 ratio         PTT - ( 31 May 2021 04:17 )  PTT:28.9 sec    PREVIOUS DIAGNOSTIC TESTING:       3.5 years battery life  AP-VS 95%  2 episodes of NSVT    Lina Lafleur MD  Good  No  No  0.4  0.6  671  21  Yes  0.4  0.5  730  4.1  Yes  DDDR  Guarnic  Haishea Ismail  Myself  Benefits, risks, and possible complications of procedure explained to patient/caregiver who verbalized understanding and gave verbal consent.  30-May-2021  Patient's first and last name, , procedure, and correct site confirmed prior to the start of procedure.  PPM Interrogation Note  < from: Xray Chest 1 View- PORTABLE-Urgent (Xray Chest 1 View- PORTABLE-Urgent .) (21 @ 16:43) >  INTERPRETATION:  CLINICAL INFORMATION: Status post fall at nursing home, onto right side. Found down.    EXAM: Chest X-ray, AP View    COMPARISON: None.    FINDINGS:    Left chest wall dual-chamber pacemaker.    The lungs are clear.    Heart size cannot be accurately assessed in this projection.    Chronic appearing fractures of the right seventh and eighth ribs.              
89 y/o F dementia, htn p/w fall at nursing home x earlier today. as per patient, pt was walking and she tripped over carpet and fell onto right side. was down for multiple hours before being found by aides. patient does not endorse head trauma or LOC. refusing pain medication, pain is 10/10 over right hip, patient cannot move right hip. Patient was [facility] to [facility] for [facility] treatment. Patient was found to have a right hip fx. Patient is scheduled for an Open reduction and internal fixation  of the right hip .        PAST MEDICAL & SURGICAL HISTORY:  Pacemaker    LEFT THR    Breast cancer    H/O left mastectomy          MEDICATIONS  (STANDING):  acetaminophen   Tablet .. 975 milliGRAM(s) Oral every 8 hours  carvedilol 6.25 milliGRAM(s) Oral every 12 hours  senna 2 Tablet(s) Oral at bedtime  sodium chloride 0.9%. 1000 milliLiter(s) (125 mL/Hr) IV Continuous <Continuous>    MEDICATIONS  (PRN):  melatonin 3 milliGRAM(s) Oral at bedtime PRN Insomnia  metoclopramide Injectable 5 milliGRAM(s) IV Push every 6 hours PRN Nausea and/or Vomiting  traMADol 25 milliGRAM(s) Oral every 4 hours PRN Moderate Pain (4 - 6)  traMADol 50 milliGRAM(s) Oral every 4 hours PRN Severe Pain (7 - 10)    Social Hx  Tobacco: neg  ETOH: Neg  Drugs: neg    Family Hx:  As per my conversation with the patient and her daughter, non contributory      ROS  CONSTITUTIONAL: No weakness, fevers or chills  EYES/ENT: No visual changes;  No vertigo or throat pain   NECK: No pain or stiffness  RESPIRATORY: No cough, wheezing, hemoptysis; No shortness of breath  CARDIOVASCULAR: No chest pain or palpitations  GASTROINTESTINAL: No abdominal or epigastric pain. No nausea, vomiting, or hematemesis; No diarrhea or constipation. No melena or hematochezia.  GENITOURINARY: No dysuria, frequency or hematuria  NEUROLOGICAL: No numbness or weakness  SKIN: No itching, burning, rashes, or lesions   MUSCULOSKELETAL: right leg pain .    INTERVAL HPI/OVERNIGHT EVENTS:  T(C): 36.9 (05-30-21 @ 22:01), Max: 37.1 (05-30-21 @ 18:40)  HR: 74 (05-30-21 @ 22:01) (22 - 74)  BP: 180/62 (05-30-21 @ 22:01) (131/105 - 192/64)  RR: 18 (05-30-21 @ 22:01) (17 - 60)  SpO2: 98% (05-30-21 @ 22:01) (94% - 99%)  Wt(kg): --  I&O's Summary      PHYSICAL EXAM:  GENERAL: NAD, well-groomed, well-developed  HEAD:  Atraumatic, Normocephalic  EYES: EOMI, PERRLA, conjunctiva and sclera clear  ENMT: No tonsillar erythema, exudates, or enlargement; Moist mucous membranes, Good dentition, No lesions  NECK: Supple, No JVD, Normal thyroid  NERVOUS SYSTEM:  Alert & Oriented X3, Good concentration; Motor Strength 5/5 B/L upper and lower extremities; DTRs 2+ intact and symmetric  CHEST/LUNG: Clear to percussion bilaterally; No rales, rhonchi, wheezing, or rubs  HEART: Regular rate and rhythm; No murmurs, rubs, or gallops  ABDOMEN: Soft, Nontender, Nondistended; Bowel sounds present  EXTREMITIES:  shortening and external rotation of the right leg   LYMPH: No lymphadenopathy noted  SKIN: No rashes or lesions        LABS:                        14.1   7.58  )-----------( 243      ( 30 May 2021 14:11 )             42.7     05-30    139  |  105  |  24<H>  ----------------------------<  121<H>  4.4   |  19<L>  |  0.92    Ca    8.9      30 May 2021 14:11    TPro  6.1  /  Alb  3.7  /  TBili  0.5  /  DBili  x   /  AST  20  /  ALT  17  /  AlkPhos  79  05-30    PT/INR - ( 30 May 2021 14:30 )   PT: 13.4 sec;   INR: 1.12 ratio         PTT - ( 30 May 2021 14:30 )  PTT:26.6 sec    EKG: Paced at 60 BPM

## 2021-05-31 NOTE — CONSULT NOTE ADULT - ASSESSMENT
89 y/o F dementia, htn p/w fall at nursing home x earlier today. as per patient, pt was walking and she tripped over carpet and fell onto right side. was down for multiple hours before being found by aides. patient does not endorse head trauma or LOC. refusing pain medication, pain is 10/10 over right hip, patient cannot move right hip  aaox2 at baseline, no cp, sob, fever, head trauma, LOC, blurry vision, vomiting, diarrhea, AC use  pt with known hx of chb s/ ppm.  pt with hx of dementia s/ ppm sec to chb , chest x ray no sign of chf   continue coreg  ppm has been checked already  nsvt on ppm interrogation before , continue beta  blocker  decrease ivf  keep hgb>8  pt is clear with mod to high risk candidate  dvt prophylaxis 89 y/o F dementia, htn p/w fall at nursing home x earlier today. as per patient, pt was walking and she tripped over carpet and fell onto right side. was down for multiple hours before being found by aides. patient does not endorse head trauma or LOC. refusing pain medication, pain is 10/10 over right hip, patient cannot move right hip  aaox2 at baseline, no cp, sob, fever, head trauma, LOC, blurry vision, vomiting, diarrhea, AC use  pt with known hx of chb s/ ppm.  pt with hx of dementia s/ ppm sec to chb , chest x ray no sign of chf   continue coreg  ppm has been checked already  nsvt on ppm interrogation before , continue beta  blocker, no clinical sign of chf  decrease ivf  keep hgb>8  pt is clear with mod to high risk candidate for surgery  dvt prophylaxis

## 2021-06-01 ENCOUNTER — TRANSCRIPTION ENCOUNTER (OUTPATIENT)
Age: 86
End: 2021-06-01

## 2021-06-01 LAB
ANION GAP SERPL CALC-SCNC: 16 MMOL/L — SIGNIFICANT CHANGE UP (ref 5–17)
BASOPHILS # BLD AUTO: 0.01 K/UL — SIGNIFICANT CHANGE UP (ref 0–0.2)
BASOPHILS NFR BLD AUTO: 0.1 % — SIGNIFICANT CHANGE UP (ref 0–2)
BUN SERPL-MCNC: 30 MG/DL — HIGH (ref 7–23)
CALCIUM SERPL-MCNC: 8.6 MG/DL — SIGNIFICANT CHANGE UP (ref 8.4–10.5)
CHLORIDE SERPL-SCNC: 109 MMOL/L — HIGH (ref 96–108)
CO2 SERPL-SCNC: 15 MMOL/L — LOW (ref 22–31)
CREAT SERPL-MCNC: 1.25 MG/DL — SIGNIFICANT CHANGE UP (ref 0.5–1.3)
EOSINOPHIL # BLD AUTO: 0 K/UL — SIGNIFICANT CHANGE UP (ref 0–0.5)
EOSINOPHIL NFR BLD AUTO: 0 % — SIGNIFICANT CHANGE UP (ref 0–6)
GLUCOSE SERPL-MCNC: 105 MG/DL — HIGH (ref 70–99)
HCT VFR BLD CALC: 32.1 % — LOW (ref 34.5–45)
HGB BLD-MCNC: 10.2 G/DL — LOW (ref 11.5–15.5)
IMM GRANULOCYTES NFR BLD AUTO: 0.6 % — SIGNIFICANT CHANGE UP (ref 0–1.5)
LYMPHOCYTES # BLD AUTO: 0.78 K/UL — LOW (ref 1–3.3)
LYMPHOCYTES # BLD AUTO: 4.9 % — LOW (ref 13–44)
MCHC RBC-ENTMCNC: 27.6 PG — SIGNIFICANT CHANGE UP (ref 27–34)
MCHC RBC-ENTMCNC: 31.8 GM/DL — LOW (ref 32–36)
MCV RBC AUTO: 86.8 FL — SIGNIFICANT CHANGE UP (ref 80–100)
MONOCYTES # BLD AUTO: 1.29 K/UL — HIGH (ref 0–0.9)
MONOCYTES NFR BLD AUTO: 8.1 % — SIGNIFICANT CHANGE UP (ref 2–14)
NEUTROPHILS # BLD AUTO: 13.75 K/UL — HIGH (ref 1.8–7.4)
NEUTROPHILS NFR BLD AUTO: 86.3 % — HIGH (ref 43–77)
NRBC # BLD: 0 /100 WBCS — SIGNIFICANT CHANGE UP (ref 0–0)
PLATELET # BLD AUTO: 226 K/UL — SIGNIFICANT CHANGE UP (ref 150–400)
POTASSIUM SERPL-MCNC: 5.3 MMOL/L — SIGNIFICANT CHANGE UP (ref 3.5–5.3)
POTASSIUM SERPL-SCNC: 5.3 MMOL/L — SIGNIFICANT CHANGE UP (ref 3.5–5.3)
RBC # BLD: 3.7 M/UL — LOW (ref 3.8–5.2)
RBC # FLD: 14.6 % — HIGH (ref 10.3–14.5)
SODIUM SERPL-SCNC: 140 MMOL/L — SIGNIFICANT CHANGE UP (ref 135–145)
WBC # BLD: 15.92 K/UL — HIGH (ref 3.8–10.5)
WBC # FLD AUTO: 15.92 K/UL — HIGH (ref 3.8–10.5)

## 2021-06-01 PROCEDURE — 76856 US EXAM PELVIC COMPLETE: CPT | Mod: 26

## 2021-06-01 RX ORDER — HALOPERIDOL DECANOATE 100 MG/ML
1 INJECTION INTRAMUSCULAR ONCE
Refills: 0 | Status: COMPLETED | OUTPATIENT
Start: 2021-06-01 | End: 2021-06-01

## 2021-06-01 RX ORDER — ACETAMINOPHEN 500 MG
1000 TABLET ORAL ONCE
Refills: 0 | Status: COMPLETED | OUTPATIENT
Start: 2021-06-01 | End: 2021-06-01

## 2021-06-01 RX ORDER — CARVEDILOL PHOSPHATE 80 MG/1
6.25 CAPSULE, EXTENDED RELEASE ORAL EVERY 12 HOURS
Refills: 0 | Status: DISCONTINUED | OUTPATIENT
Start: 2021-06-01 | End: 2021-06-03

## 2021-06-01 RX ORDER — HALOPERIDOL DECANOATE 100 MG/ML
0.5 INJECTION INTRAMUSCULAR ONCE
Refills: 0 | Status: COMPLETED | OUTPATIENT
Start: 2021-06-01 | End: 2021-06-01

## 2021-06-01 RX ADMIN — Medication 81 MILLIGRAM(S): at 17:53

## 2021-06-01 RX ADMIN — CARVEDILOL PHOSPHATE 6.25 MILLIGRAM(S): 80 CAPSULE, EXTENDED RELEASE ORAL at 17:53

## 2021-06-01 RX ADMIN — HALOPERIDOL DECANOATE 1 MILLIGRAM(S): 100 INJECTION INTRAMUSCULAR at 03:20

## 2021-06-01 RX ADMIN — Medication 3 MILLIGRAM(S): at 21:40

## 2021-06-01 RX ADMIN — Medication 400 MILLIGRAM(S): at 05:59

## 2021-06-01 RX ADMIN — POLYETHYLENE GLYCOL 3350 17 GRAM(S): 17 POWDER, FOR SOLUTION ORAL at 12:38

## 2021-06-01 RX ADMIN — Medication 15 MILLIGRAM(S): at 00:16

## 2021-06-01 RX ADMIN — Medication 975 MILLIGRAM(S): at 21:39

## 2021-06-01 RX ADMIN — Medication 2000 UNIT(S): at 12:38

## 2021-06-01 RX ADMIN — HALOPERIDOL DECANOATE 0.5 MILLIGRAM(S): 100 INJECTION INTRAMUSCULAR at 00:03

## 2021-06-01 RX ADMIN — Medication 1000 MILLIGRAM(S): at 06:30

## 2021-06-01 RX ADMIN — Medication 1 TABLET(S): at 12:52

## 2021-06-01 RX ADMIN — Medication 100 MILLIGRAM(S): at 05:59

## 2021-06-01 NOTE — DISCHARGE NOTE PROVIDER - NSDCMRMEDTOKEN_GEN_ALL_CORE_FT
acetaminophen 325 mg oral tablet: 3 tab(s) orally every 8 hours  aspirin 81 mg oral delayed release tablet: 1 tab(s) orally 2 times a day  bisacodyl 10 mg rectal suppository: 1 suppository(ies) rectal once a day, As needed, If no bowel movement  calcium-vitamin D 500 mg-200 intl units (5 mcg) oral tablet: 1 tab(s) orally once a day  carvedilol 6.25 mg oral tablet: 1 tab(s) orally every 12 hours  cholecalciferol oral tablet: 2000 unit(s) orally once a day  erythromycin 0.5% ophthalmic ointment: 1 application in the left eye 4 times a day   magnesium hydroxide 8% oral suspension: 30 milliliter(s) orally once a day, As needed, Constipation  melatonin 3 mg oral tablet: 1 tab(s) orally once a day (at bedtime), As needed, Insomnia  Ocuflox 0.3% ophthalmic solution: 1 drop(s) in the left eye 4 times a day   polyethylene glycol 3350 oral powder for reconstitution: 17 gram(s) orally once a day  senna oral tablet: 2 tab(s) orally once a day (at bedtime)  traMADol 50 mg oral tablet: 0.5 tab(s) orally every 4 hours, As needed, Moderate Pain (4 - 6)  traMADol 50 mg oral tablet: 1 tab(s) orally every 4 hours, As needed, Severe Pain (7 - 10)

## 2021-06-01 NOTE — OCCUPATIONAL THERAPY INITIAL EVALUATION ADULT - BALANCE TRAINING, PT EVAL
GOAL: Pt will demonstrate improved static/dynamic balance to fair - in order to increase safety and independence in ADLs within 4 weeks

## 2021-06-01 NOTE — DISCHARGE NOTE PROVIDER - NSDCFUADDINST_GEN_ALL_CORE_FT
Continue weight bearing as tolerated ambulation with rolling walker and maintaining posterior total hip precautions. Keep surgical incision/Aquacel dressing clean and dry, and have dressing removed post operative day #14 (6/14/2021). Follow up with Dr. Burton in his office 3-4 weeks post op. Continue weight bearing as tolerated ambulation with rolling walker and maintaining posterior total hip precautions. Keep surgical incision/Aquacel dressing clean and dry, and have dressing removed post operative day #14 (6/14/2021).

## 2021-06-01 NOTE — DISCHARGE NOTE PROVIDER - HOSPITAL COURSE
89y Female community ambulator presents c/o RIGHT  hip pain and inability to ambulate sp mechanical fall. Denies HS/LOC. Denies numbness/tingling. Denies fever/chills. Denies pain/injury elsewhere. Community ambulator at baseline.    PAST MEDICAL & SURGICAL HISTORY:  Pacemaker  Breast cancer  H/O left mastectomy    HOSPITAL COURSE:  89 y/o FM underwent right hip hemiarthroplasty on 5/31/2021 with Dr. Burton.  Patient tolerated procedure well.  Patient was evaluated postoperatively by physical and occupational therapists and advised that patient would benefit from admission to rehab facility.  Patient advised to keep surgical incision/dressing clean and dry, and have dressing/surgical/sutures/staples removed and steri strips applied post op day #14(6/14/2021)if applicable.  Patient further advised to follow up with Dr. Burton in his office 3-4 weeks post op.   89y Female community ambulator presents c/o RIGHT  hip pain and inability to ambulate sp mechanical fall. Denies HS/LOC. Denies numbness/tingling. Denies fever/chills. Denies pain/injury elsewhere. Community ambulator at baseline.    PAST MEDICAL & SURGICAL HISTORY:  Pacemaker  Breast cancer  H/O left mastectomy    HOSPITAL COURSE:  89 y/o FM underwent right hip hemiarthroplasty on 5/31/2021 with Dr. Burton.  Patient tolerated procedure well.  Patient was evaluated postoperatively by physical and occupational therapists and advised that patient would benefit from admission to rehab facility.  Patient obtained a Bryson Catheter on 6/3/21 due to urinary retention and patient to leave to sub acute rehab with Bryson catheter where she will attempt a trial of void when she is ambulating more.  Patient advised to keep surgical incision/dressing clean and dry, and have dressing/surgical/sutures/staples removed and steri strips applied post op day #14(6/14/2021)if applicable.  Patient further advised to follow up with Dr. Burton in his office 3-4 weeks post op.

## 2021-06-01 NOTE — OCCUPATIONAL THERAPY INITIAL EVALUATION ADULT - PERTINENT HX OF CURRENT PROBLEM, REHAB EVAL
89y Female community ambulator presents c/o RIGHT  hip pain and inability to ambulate sp mechanical fall. Denies HS/LOC. Denies numbness/tingling. Denies fever/chills. Denies pain/injury elsewhere. Community ambulator at baseline. See below

## 2021-06-01 NOTE — DISCHARGE NOTE PROVIDER - NSDCFUADDAPPT_GEN_ALL_CORE_FT
Follow up with Dr. Burton in his office 3-4 weeks post op.  Please call to schedule your appointment.

## 2021-06-01 NOTE — DISCHARGE NOTE PROVIDER - NSDCCPCAREPLAN_GEN_ALL_CORE_FT
PRINCIPAL DISCHARGE DIAGNOSIS  Diagnosis: Femur fracture, right  Assessment and Plan of Treatment:

## 2021-06-01 NOTE — DISCHARGE NOTE PROVIDER - CARE PROVIDER_API CALL
Nicholas Burton  ORTHOPAEDIC SURGERY  [address] [place] [address], [address]  [place] [address]  Phone: [phone]  Fax: [phone]  Follow Up Time:

## 2021-06-01 NOTE — OCCUPATIONAL THERAPY INITIAL EVALUATION ADULT - PRECAUTIONS/LIMITATIONS, REHAB EVAL
Pt s/p R hip cecille, posterior/right hip precautions Pt s/p R hip cecille, posterior/fall precautions/right hip precautions

## 2021-06-01 NOTE — OCCUPATIONAL THERAPY INITIAL EVALUATION ADULT - LIVES WITH, PROFILE
Pt confused, as per care coordination pt is a resident at an residential and amb with RW. Pt required assist for ADLs.

## 2021-06-01 NOTE — PHYSICAL THERAPY INITIAL EVALUATION ADULT - PERTINENT HX OF CURRENT PROBLEM, REHAB EVAL
Pt is a  89 y/o female admitted to [facility] on 5/30/21 p/w fall at nursing home x earlier today.   As per patient, pt was walking and she tripped over carpet and fell onto right side. Pt was down for multiple hours before being found by aides. CT pelvis: Right subcapital femoral neck fracture with angulation and impaction

## 2021-06-02 LAB
ANION GAP SERPL CALC-SCNC: 12 MMOL/L — SIGNIFICANT CHANGE UP (ref 5–17)
BUN SERPL-MCNC: 30 MG/DL — HIGH (ref 7–23)
CALCIUM SERPL-MCNC: 8.8 MG/DL — SIGNIFICANT CHANGE UP (ref 8.4–10.5)
CHLORIDE SERPL-SCNC: 111 MMOL/L — HIGH (ref 96–108)
CO2 SERPL-SCNC: 17 MMOL/L — LOW (ref 22–31)
CREAT SERPL-MCNC: 1.08 MG/DL — SIGNIFICANT CHANGE UP (ref 0.5–1.3)
GLUCOSE SERPL-MCNC: 83 MG/DL — SIGNIFICANT CHANGE UP (ref 70–99)
HCT VFR BLD CALC: 30.8 % — LOW (ref 34.5–45)
HGB BLD-MCNC: 9.8 G/DL — LOW (ref 11.5–15.5)
MCHC RBC-ENTMCNC: 27.9 PG — SIGNIFICANT CHANGE UP (ref 27–34)
MCHC RBC-ENTMCNC: 31.8 GM/DL — LOW (ref 32–36)
MCV RBC AUTO: 87.7 FL — SIGNIFICANT CHANGE UP (ref 80–100)
NRBC # BLD: 0 /100 WBCS — SIGNIFICANT CHANGE UP (ref 0–0)
PLATELET # BLD AUTO: 176 K/UL — SIGNIFICANT CHANGE UP (ref 150–400)
POTASSIUM SERPL-MCNC: 4.5 MMOL/L — SIGNIFICANT CHANGE UP (ref 3.5–5.3)
POTASSIUM SERPL-SCNC: 4.5 MMOL/L — SIGNIFICANT CHANGE UP (ref 3.5–5.3)
RBC # BLD: 3.51 M/UL — LOW (ref 3.8–5.2)
RBC # FLD: 14.9 % — HIGH (ref 10.3–14.5)
SODIUM SERPL-SCNC: 140 MMOL/L — SIGNIFICANT CHANGE UP (ref 135–145)
WBC # BLD: 9.44 K/UL — SIGNIFICANT CHANGE UP (ref 3.8–10.5)
WBC # FLD AUTO: 9.44 K/UL — SIGNIFICANT CHANGE UP (ref 3.8–10.5)

## 2021-06-02 RX ADMIN — CARVEDILOL PHOSPHATE 6.25 MILLIGRAM(S): 80 CAPSULE, EXTENDED RELEASE ORAL at 06:47

## 2021-06-02 RX ADMIN — CARVEDILOL PHOSPHATE 6.25 MILLIGRAM(S): 80 CAPSULE, EXTENDED RELEASE ORAL at 17:40

## 2021-06-02 RX ADMIN — Medication 975 MILLIGRAM(S): at 23:15

## 2021-06-02 RX ADMIN — POLYETHYLENE GLYCOL 3350 17 GRAM(S): 17 POWDER, FOR SOLUTION ORAL at 11:53

## 2021-06-02 RX ADMIN — Medication 81 MILLIGRAM(S): at 05:40

## 2021-06-02 RX ADMIN — Medication 975 MILLIGRAM(S): at 13:21

## 2021-06-02 RX ADMIN — Medication 975 MILLIGRAM(S): at 05:40

## 2021-06-02 RX ADMIN — Medication 975 MILLIGRAM(S): at 13:50

## 2021-06-02 RX ADMIN — Medication 975 MILLIGRAM(S): at 22:46

## 2021-06-02 RX ADMIN — Medication 2000 UNIT(S): at 11:53

## 2021-06-02 RX ADMIN — Medication 1 TABLET(S): at 11:53

## 2021-06-02 RX ADMIN — Medication 81 MILLIGRAM(S): at 17:40

## 2021-06-02 RX ADMIN — SENNA PLUS 2 TABLET(S): 8.6 TABLET ORAL at 22:46

## 2021-06-02 NOTE — PROVIDER CONTACT NOTE (OTHER) - DATE AND TIME:
31-May-2021 20:30
01-Jun-2021 03:10
31-May-2021 23:30
02-Jun-2021 04:30
31-May-2021 04:21
02-Jun-2021 13:00

## 2021-06-02 NOTE — PROVIDER CONTACT NOTE (OTHER) - REASON
Pt DTV 2030 last night, bladder scan 426
Pt due to void at 1300- retaining 298 on bladder scan
Pt very agitated and confused
Urinary Retention
Urinary Retention
AMS- Agitation, Urinary Retention

## 2021-06-02 NOTE — PROVIDER CONTACT NOTE (OTHER) - ASSESSMENT
A&Ox2, Pt VSS, Pt c/o inability to void, pt voided little amount when bladder stimulated, Bladder scan shows ~572cc of urine in bladder. Pt on IVF, NS @125, NPO since MN. Pt going to OR this AM.
Pt A&Ox2, DTV since @1230pm, pt fresh post op tx to floor, bladder scan shows ~291cc of urine in bladder, bladder slightly distended, pt does not have urge to urinate, pt on IVF NS @75.
Pt A&Ox1, confused to situation, time, and place, extremely agitated, attempting to climb OOB w/o assist, pulling at abduction pillow, attempting to remove IV, grabbing and biting at staff members. Pt DTV since @1230, Bladder scan show ~412 in bladder, bladder distended, pt unable to void, on IVF .
Pt DTV 2030 last night, bladder scan 426. Pt reports urge to void but states "its not coming out" Slight distention noted to abdomen with pain on palpation. Given previous orders from RONY Castillo to straight cath pt if pt bladder scan over 500ml
pt resting in bed, no signs of distress, sleeping. Pt encouraged to void, unable to. Primafit attached
Pt A&Ox1, very confused and agitated, screaming and yelling out, pt bucking hips/legs in an attempt to remove abduction pillow, pt yelling "I need to leave" attempting to get OOB w/o assist, on B/L Wrist restraints, attempting to remove restraints. Pt on cont. pox and 2L NC.

## 2021-06-02 NOTE — PROVIDER CONTACT NOTE (OTHER) - RECOMMENDATIONS
Notify Provider
MD Persaud made aware of pt situation
Notify provider
MD Persaud made aware of situation, to come see pt @bedside.
Bryson catheter recommended, MD Toussaint made aware.
MD Persaud made aware

## 2021-06-02 NOTE — PROVIDER CONTACT NOTE (OTHER) - BACKGROUND
S/P Right hip Paresh POD#1
see H&P
S/P R hip Hemiarthoplasty
s/p R hip cecille arthoplasty
S/P Fall, Right femur Fracture
S/P POD#0 Right Hip Hemoarthoplasty, hx of Dementia

## 2021-06-02 NOTE — PROVIDER CONTACT NOTE (OTHER) - ACTION/TREATMENT ORDERED:
Straight catheterization as per MD Toussaint, if pt does not void 6-8 hour after straight cath, to re-bladder scan and call provider with results, safety maint.
0.5mg Ativan, 1mg Haldol IVP, 15mg Toradol given, MD Persaud @bedside, pt placed in B/L soft wrist restraints, Bryson inserted for retention, bed alarm active, safety maintained.
Haldol 1mg IVP given for agitation, to attempt to obtain an EKG/VS when pt calmer, bed alarm active, abduction pillow and linens replaced, safety maintained.
PO Fluid intake encouraged, to rescan @2230, call provider w/ results, possible Bryson catheter if bladder scan shows large amounts of urine. Safety maintained.
As Per MD Beltran straight cath pt now since pt is having discomfort. No other interventions ordered at this time.
No interventions ordered at this time, as per RONY Schwartz. Will re-check bladder scan at 1500.

## 2021-06-02 NOTE — PROVIDER CONTACT NOTE (OTHER) - SITUATION
AMS- Agitation, Urinary Retention
Pt due to void at 1300- retaining 298 on bladder scan
Pt very agitated and confused
Pt DTV 2030 last night, bladder scan 426
Urinary Retention
Urinary Retention

## 2021-06-03 ENCOUNTER — TRANSCRIPTION ENCOUNTER (OUTPATIENT)
Age: 86
End: 2021-06-03

## 2021-06-03 VITALS
DIASTOLIC BLOOD PRESSURE: 68 MMHG | RESPIRATION RATE: 17 BRPM | OXYGEN SATURATION: 95 % | TEMPERATURE: 98 F | HEART RATE: 60 BPM | SYSTOLIC BLOOD PRESSURE: 159 MMHG

## 2021-06-03 LAB
APPEARANCE UR: CLEAR — SIGNIFICANT CHANGE UP
BACTERIA # UR AUTO: NEGATIVE — SIGNIFICANT CHANGE UP
BILIRUB UR-MCNC: NEGATIVE — SIGNIFICANT CHANGE UP
COLOR SPEC: SIGNIFICANT CHANGE UP
COMMENT - URINE: SIGNIFICANT CHANGE UP
DIFF PNL FLD: ABNORMAL
EPI CELLS # UR: 2 — SIGNIFICANT CHANGE UP
GLUCOSE UR QL: NEGATIVE — SIGNIFICANT CHANGE UP
HYALINE CASTS # UR AUTO: 3 /LPF — HIGH (ref 0–7)
KETONES UR-MCNC: SIGNIFICANT CHANGE UP
LEUKOCYTE ESTERASE UR-ACNC: ABNORMAL
NITRITE UR-MCNC: NEGATIVE — SIGNIFICANT CHANGE UP
PH UR: 6 — SIGNIFICANT CHANGE UP (ref 5–8)
PROT UR-MCNC: NEGATIVE — SIGNIFICANT CHANGE UP
RBC CASTS # UR COMP ASSIST: 2 /HPF — SIGNIFICANT CHANGE UP (ref 0–4)
SARS-COV-2 RNA SPEC QL NAA+PROBE: SIGNIFICANT CHANGE UP
SP GR SPEC: 1.02 — SIGNIFICANT CHANGE UP (ref 1.01–1.02)
UROBILINOGEN FLD QL: NEGATIVE — SIGNIFICANT CHANGE UP
WBC UR QL: 24 /HPF — HIGH (ref 0–5)

## 2021-06-03 PROCEDURE — 85025 COMPLETE CBC W/AUTO DIFF WBC: CPT

## 2021-06-03 PROCEDURE — 85018 HEMOGLOBIN: CPT

## 2021-06-03 PROCEDURE — 82803 BLOOD GASES ANY COMBINATION: CPT

## 2021-06-03 PROCEDURE — 84132 ASSAY OF SERUM POTASSIUM: CPT

## 2021-06-03 PROCEDURE — 71045 X-RAY EXAM CHEST 1 VIEW: CPT

## 2021-06-03 PROCEDURE — 82435 ASSAY OF BLOOD CHLORIDE: CPT

## 2021-06-03 PROCEDURE — 72170 X-RAY EXAM OF PELVIS: CPT

## 2021-06-03 PROCEDURE — 82330 ASSAY OF CALCIUM: CPT

## 2021-06-03 PROCEDURE — C1713: CPT

## 2021-06-03 PROCEDURE — 99285 EMERGENCY DEPT VISIT HI MDM: CPT

## 2021-06-03 PROCEDURE — 93005 ELECTROCARDIOGRAM TRACING: CPT

## 2021-06-03 PROCEDURE — 73502 X-RAY EXAM HIP UNI 2-3 VIEWS: CPT

## 2021-06-03 PROCEDURE — 86850 RBC ANTIBODY SCREEN: CPT

## 2021-06-03 PROCEDURE — 85730 THROMBOPLASTIN TIME PARTIAL: CPT

## 2021-06-03 PROCEDURE — 82550 ASSAY OF CK (CPK): CPT

## 2021-06-03 PROCEDURE — 76377 3D RENDER W/INTRP POSTPROCES: CPT

## 2021-06-03 PROCEDURE — 85027 COMPLETE CBC AUTOMATED: CPT

## 2021-06-03 PROCEDURE — 97112 NEUROMUSCULAR REEDUCATION: CPT

## 2021-06-03 PROCEDURE — 85610 PROTHROMBIN TIME: CPT

## 2021-06-03 PROCEDURE — 80053 COMPREHEN METABOLIC PANEL: CPT

## 2021-06-03 PROCEDURE — 97530 THERAPEUTIC ACTIVITIES: CPT

## 2021-06-03 PROCEDURE — C1889: CPT

## 2021-06-03 PROCEDURE — 76856 US EXAM PELVIC COMPLETE: CPT

## 2021-06-03 PROCEDURE — C1776: CPT

## 2021-06-03 PROCEDURE — U0005: CPT

## 2021-06-03 PROCEDURE — 86900 BLOOD TYPING SEROLOGIC ABO: CPT

## 2021-06-03 PROCEDURE — 97161 PT EVAL LOW COMPLEX 20 MIN: CPT

## 2021-06-03 PROCEDURE — 97110 THERAPEUTIC EXERCISES: CPT

## 2021-06-03 PROCEDURE — 97165 OT EVAL LOW COMPLEX 30 MIN: CPT

## 2021-06-03 PROCEDURE — 84295 ASSAY OF SERUM SODIUM: CPT

## 2021-06-03 PROCEDURE — 85014 HEMATOCRIT: CPT

## 2021-06-03 PROCEDURE — 83605 ASSAY OF LACTIC ACID: CPT

## 2021-06-03 PROCEDURE — 86769 SARS-COV-2 COVID-19 ANTIBODY: CPT

## 2021-06-03 PROCEDURE — 72192 CT PELVIS W/O DYE: CPT

## 2021-06-03 PROCEDURE — U0003: CPT

## 2021-06-03 PROCEDURE — 81001 URINALYSIS AUTO W/SCOPE: CPT

## 2021-06-03 PROCEDURE — 70450 CT HEAD/BRAIN W/O DYE: CPT

## 2021-06-03 PROCEDURE — 82947 ASSAY GLUCOSE BLOOD QUANT: CPT

## 2021-06-03 PROCEDURE — 73552 X-RAY EXAM OF FEMUR 2/>: CPT

## 2021-06-03 PROCEDURE — 86901 BLOOD TYPING SEROLOGIC RH(D): CPT

## 2021-06-03 PROCEDURE — 80048 BASIC METABOLIC PNL TOTAL CA: CPT

## 2021-06-03 RX ORDER — SODIUM CHLORIDE 9 MG/ML
1000 INJECTION, SOLUTION INTRAVENOUS
Refills: 0 | Status: DISCONTINUED | OUTPATIENT
Start: 2021-06-03 | End: 2021-06-03

## 2021-06-03 RX ORDER — TRAMADOL HYDROCHLORIDE 50 MG/1
1 TABLET ORAL
Qty: 0 | Refills: 0 | DISCHARGE
Start: 2021-06-03

## 2021-06-03 RX ORDER — ASPIRIN/CALCIUM CARB/MAGNESIUM 324 MG
1 TABLET ORAL
Qty: 0 | Refills: 0 | DISCHARGE
Start: 2021-06-03

## 2021-06-03 RX ORDER — SENNA PLUS 8.6 MG/1
2 TABLET ORAL
Qty: 0 | Refills: 0 | DISCHARGE
Start: 2021-06-03

## 2021-06-03 RX ORDER — ACETAMINOPHEN 500 MG
3 TABLET ORAL
Qty: 0 | Refills: 0 | DISCHARGE
Start: 2021-06-03

## 2021-06-03 RX ORDER — MAGNESIUM HYDROXIDE 400 MG/1
30 TABLET, CHEWABLE ORAL
Qty: 0 | Refills: 0 | DISCHARGE
Start: 2021-06-03

## 2021-06-03 RX ORDER — SODIUM CHLORIDE 9 MG/ML
250 INJECTION INTRAMUSCULAR; INTRAVENOUS; SUBCUTANEOUS ONCE
Refills: 0 | Status: COMPLETED | OUTPATIENT
Start: 2021-06-03 | End: 2021-06-03

## 2021-06-03 RX ORDER — CARVEDILOL PHOSPHATE 80 MG/1
1 CAPSULE, EXTENDED RELEASE ORAL
Qty: 0 | Refills: 0 | DISCHARGE
Start: 2021-06-03

## 2021-06-03 RX ORDER — POLYETHYLENE GLYCOL 3350 17 G/17G
17 POWDER, FOR SOLUTION ORAL
Qty: 0 | Refills: 0 | DISCHARGE
Start: 2021-06-03

## 2021-06-03 RX ORDER — LANOLIN ALCOHOL/MO/W.PET/CERES
1 CREAM (GRAM) TOPICAL
Qty: 0 | Refills: 0 | DISCHARGE
Start: 2021-06-03

## 2021-06-03 RX ORDER — CHOLECALCIFEROL (VITAMIN D3) 125 MCG
2000 CAPSULE ORAL
Qty: 0 | Refills: 0 | DISCHARGE
Start: 2021-06-03

## 2021-06-03 RX ORDER — TRAMADOL HYDROCHLORIDE 50 MG/1
0.5 TABLET ORAL
Qty: 0 | Refills: 0 | DISCHARGE
Start: 2021-06-03

## 2021-06-03 RX ADMIN — CARVEDILOL PHOSPHATE 6.25 MILLIGRAM(S): 80 CAPSULE, EXTENDED RELEASE ORAL at 17:55

## 2021-06-03 RX ADMIN — Medication 2000 UNIT(S): at 11:35

## 2021-06-03 RX ADMIN — SODIUM CHLORIDE 50 MILLILITER(S): 9 INJECTION, SOLUTION INTRAVENOUS at 08:59

## 2021-06-03 RX ADMIN — Medication 1 TABLET(S): at 11:35

## 2021-06-03 RX ADMIN — Medication 975 MILLIGRAM(S): at 06:36

## 2021-06-03 RX ADMIN — SODIUM CHLORIDE 250 MILLILITER(S): 9 INJECTION INTRAMUSCULAR; INTRAVENOUS; SUBCUTANEOUS at 10:08

## 2021-06-03 RX ADMIN — Medication 81 MILLIGRAM(S): at 06:35

## 2021-06-03 RX ADMIN — CARVEDILOL PHOSPHATE 6.25 MILLIGRAM(S): 80 CAPSULE, EXTENDED RELEASE ORAL at 06:35

## 2021-06-03 RX ADMIN — Medication 975 MILLIGRAM(S): at 13:54

## 2021-06-03 RX ADMIN — Medication 975 MILLIGRAM(S): at 13:24

## 2021-06-03 RX ADMIN — Medication 81 MILLIGRAM(S): at 17:54

## 2021-06-03 NOTE — PROGRESS NOTE ADULT - SUBJECTIVE AND OBJECTIVE BOX
CARDIOLOGY     PROGRESS  NOTE   ________________________________________________    CHIEF COMPLAINT:Patient is a 89y old  Female who presents with a chief complaint of Right hip fracture (01 Jun 2021 09:01)  no complain.  	  REVIEW OF SYSTEMS:  CONSTITUTIONAL: No fever, weight loss, or fatigue  EYES: No eye pain, visual disturbances, or discharge  ENT:  No difficulty hearing, tinnitus, vertigo; No sinus or throat pain  NECK: No pain or stiffness  RESPIRATORY: No cough, wheezing, chills or hemoptysis; No Shortness of Breath  CARDIOVASCULAR: No chest pain, palpitations, passing out, dizziness, or leg swelling  GASTROINTESTINAL: No abdominal or epigastric pain. No nausea, vomiting, or hematemesis; No diarrhea or constipation. No melena or hematochezia.  GENITOURINARY: No dysuria, frequency, hematuria, or incontinence  NEUROLOGICAL: No headaches, memory loss, loss of strength, numbness, or tremors  SKIN: No itching, burning, rashes, or lesions   LYMPH Nodes: No enlarged glands  ENDOCRINE: No heat or cold intolerance; No hair loss  MUSCULOSKELETAL: No joint pain or swelling; No muscle, back, or extremity pain  PSYCHIATRIC: No depression, anxiety, mood swings, or difficulty sleeping  HEME/LYMPH: No easy bruising, or bleeding gums  ALLERGY AND IMMUNOLOGIC: No hives or eczema	    [ ] All others negative	  [ ] Unable to obtain    PHYSICAL EXAM:  T(C): 36.8 (06-02-21 @ 04:35), Max: 36.9 (06-01-21 @ 16:00)  HR: 59 (06-02-21 @ 04:35) (59 - 89)  BP: 152/73 (06-02-21 @ 04:35) (114/58 - 157/74)  RR: 18 (06-02-21 @ 04:35) (18 - 18)  SpO2: 95% (06-02-21 @ 04:35) (93% - 95%)  Wt(kg): --  I&O's Summary    31 May 2021 07:01  -  01 Jun 2021 07:00  --------------------------------------------------------  IN: 1525 mL / OUT: 500 mL / NET: 1025 mL    01 Jun 2021 07:01  -  02 Jun 2021 06:45  --------------------------------------------------------  IN: 540 mL / OUT: 1150 mL / NET: -610 mL        Appearance: Normal	  HEENT:   Normal oral mucosa, PERRL, EOMI	  Lymphatic: No lymphadenopathy  Cardiovascular: Normal S1 S2, No JVD, + murmurs, No edema  Respiratory: rhonchi  Psychiatry: A & O x 3, Mood & affect appropriate  Gastrointestinal:  Soft, Non-tender, + BS	  Skin: No rashes, No ecchymoses, No cyanosis	  Neurologic: Non-focal  Extremities: Normal range of motion, No clubbing, cyanosis or edema  Vascular: Peripheral pulses palpable 2+ bilaterally    MEDICATIONS  (STANDING):  acetaminophen   Tablet .. 975 milliGRAM(s) Oral every 8 hours  aspirin enteric coated 81 milliGRAM(s) Oral two times a day  calcium carbonate 1250 mG  + Vitamin D (OsCal 500 + D) 1 Tablet(s) Oral daily  carvedilol 6.25 milliGRAM(s) Oral every 12 hours  cholecalciferol 2000 Unit(s) Oral daily  polyethylene glycol 3350 17 Gram(s) Oral daily  senna 2 Tablet(s) Oral at bedtime  sodium chloride 0.9% Bolus 500 milliLiter(s) IV Bolus once  sodium chloride 0.9%. 1000 milliLiter(s) (75 mL/Hr) IV Continuous <Continuous>      TELEMETRY: 	    ECG:  	  RADIOLOGY:  OTHER: 	  	  LABS:	 	    CARDIAC MARKERS:                                10.2   15.92 )-----------( 226      ( 01 Jun 2021 07:09 )             32.1     06-01    140  |  109<H>  |  30<H>  ----------------------------<  105<H>  5.3   |  15<L>  |  1.25    Ca    8.6      01 Jun 2021 07:08      proBNP:   Lipid Profile:   HgA1c:   TSH:         Assessment and plan  ---------------------------  89 y/o F dementia, htn p/w fall at nursing home x earlier today. as per patient, pt was walking and she tripped over carpet and fell onto right side. was down for multiple hours before being found by aides. patient does not endorse head trauma or LOC. refusing pain medication, pain is 10/10 over right hip, patient cannot move right hip  aaox2 at baseline, no cp, sob, fever, head trauma, LOC, blurry vision, vomiting, diarrhea, AC use  pt with known hx of chb s/ ppm.  pt with hx of dementia s/ ppm sec to chb , chest x ray no sign of chf   ppm has been checked already  nsvt on ppm interrogation before , continue beta  blocker, no clinical sign of chf  decrease ivf  keep hgb>8  pt is clear with mod to high risk candidate for surgery  dvt prophylaxis  doing well post op  re start on coreg 6.25 mg bid  dvt prophylaxis  will adjust meds as needed  check bladder scan    	        
           CARDIOLOGY     PROGRESS  NOTE   ________________________________________________    CHIEF COMPLAINT:Patient is a 89y old  Female who presents with a chief complaint of hip fx (31 May 2021 08:24)  pain.  	  REVIEW OF SYSTEMS:  CONSTITUTIONAL: No fever, weight loss, or fatigue  EYES: No eye pain, visual disturbances, or discharge  ENT:  No difficulty hearing, tinnitus, vertigo; No sinus or throat pain  NECK: No pain or stiffness  RESPIRATORY: No cough, wheezing, chills or hemoptysis; No Shortness of Breath  CARDIOVASCULAR: No chest pain, palpitations, passing out, dizziness, or leg swelling  GASTROINTESTINAL: No abdominal or epigastric pain. No nausea, vomiting, or hematemesis; No diarrhea or constipation. No melena or hematochezia.  GENITOURINARY: No dysuria, frequency, hematuria, or incontinence  NEUROLOGICAL: No headaches, memory loss, loss of strength, numbness, or tremors  SKIN: No itching, burning, rashes, or lesions   LYMPH Nodes: No enlarged glands  ENDOCRINE: No heat or cold intolerance; No hair loss  MUSCULOSKELETAL: No joint pain or swelling; No muscle, back, or extremity pain  PSYCHIATRIC: No depression, anxiety, mood swings, or difficulty sleeping  HEME/LYMPH: No easy bruising, or bleeding gums  ALLERGY AND IMMUNOLOGIC: No hives or eczema	    [ ] All others negative	  [ ] Unable to obtain    PHYSICAL EXAM:  T(C): 36.9 (06-01-21 @ 04:44), Max: 36.9 (06-01-21 @ 04:44)  HR: 91 (06-01-21 @ 04:44) (55 - 102)  BP: 154/70 (06-01-21 @ 04:44) (129/69 - 184/74)  RR: 18 (06-01-21 @ 04:44) (12 - 18)  SpO2: 94% (06-01-21 @ 04:44) (93% - 100%)  Wt(kg): --  I&O's Summary    31 May 2021 07:01  -  01 Jun 2021 07:00  --------------------------------------------------------  IN: 1525 mL / OUT: 500 mL / NET: 1025 mL        Appearance: Normal	  HEENT:   Normal oral mucosa, PERRL, EOMI	  Lymphatic: No lymphadenopathy  Cardiovascular: Normal S1 S2, No JVD, +murmurs, No edema  Respiratory: Lungs clear to auscultation	  Psychiatry: A & O x 3, Mood & affect appropriate  Gastrointestinal:  Soft, Non-tender, + BS	  Skin: No rashes, No ecchymoses, No cyanosis	  Neurologic: Non-focal  Extremities: Normal range of motion, No clubbing, cyanosis or edema  Vascular: Peripheral pulses palpable 2+ bilaterally    MEDICATIONS  (STANDING):  acetaminophen   Tablet .. 975 milliGRAM(s) Oral every 8 hours  aspirin enteric coated 81 milliGRAM(s) Oral two times a day  calcium carbonate 1250 mG  + Vitamin D (OsCal 500 + D) 1 Tablet(s) Oral daily  cholecalciferol 2000 Unit(s) Oral daily  polyethylene glycol 3350 17 Gram(s) Oral daily  QUEtiapine 25 milliGRAM(s) Oral at bedtime  senna 2 Tablet(s) Oral at bedtime  sodium chloride 0.9% Bolus 500 milliLiter(s) IV Bolus once  sodium chloride 0.9%. 1000 milliLiter(s) (75 mL/Hr) IV Continuous <Continuous>      TELEMETRY: 	    ECG:  	  RADIOLOGY:  OTHER: 	  	  LABS:	 	    CARDIAC MARKERS:  CARDIAC MARKERS ( 30 May 2021 14:11 )  x     / x     / 85 U/L / x     / x                                    12.2   13.81 )-----------( 196      ( 31 May 2021 15:46 )             38.3     05-31    139  |  111<H>  |  26<H>  ----------------------------<  152<H>  5.6<H>   |  15<L>  |  0.87    Ca    7.7<L>      31 May 2021 18:40    TPro  6.1  /  Alb  3.7  /  TBili  0.5  /  DBili  x   /  AST  20  /  ALT  17  /  AlkPhos  79  05-30    proBNP:   Lipid Profile:   HgA1c:   TSH:   PT/INR - ( 31 May 2021 04:17 )   PT: 15.0 sec;   INR: 1.26 ratio         PTT - ( 31 May 2021 04:17 )  PTT:28.9 sec    89F sp R Paresh    Neuro: Pain control  Resp: IS  GI: Regular diet, bowel reg  MSK: WBAT, PT/OT  Heme: DVT PPX    Assessment and plan  ---------------------------  89 y/o F dementia, htn p/w fall at nursing home x earlier today. as per patient, pt was walking and she tripped over carpet and fell onto right side. was down for multiple hours before being found by aides. patient does not endorse head trauma or LOC. refusing pain medication, pain is 10/10 over right hip, patient cannot move right hip  aaox2 at baseline, no cp, sob, fever, head trauma, LOC, blurry vision, vomiting, diarrhea, AC use  pt with known hx of chb s/ ppm.  pt with hx of dementia s/ ppm sec to chb , chest x ray no sign of chf   ppm has been checked already  nsvt on ppm interrogation before , continue beta  blocker, no clinical sign of chf  decrease ivf  keep hgb>8  pt is clear with mod to high risk candidate for surgery  dvt prophylaxis  doing well post op  re start on coreg 6.25 mg bid  dvt prophylaxis    	        
Ortho Progress Note    S: Patient seen and examined. No acute events overnight. Pain well controlled with current regimen. Denies lightheadedness/dizziness, CP/SOB. Tolerating diet.       O:  Physical Exam:  Gen: Laying in bed, NAD, alert and but confused.   Resp: Unlabored breathing  Ext: EHL/FHL/TA/Sol intact          + SILT DP/SP/FITZGERALD/Sa/Tib          +DP, extremity WWP    ICU Vital Signs Last 24 Hrs  T(C): 36.8 (02 Jun 2021 04:35), Max: 36.9 (01 Jun 2021 16:00)  T(F): 98.3 (02 Jun 2021 04:35), Max: 98.5 (01 Jun 2021 16:00)  HR: 59 (02 Jun 2021 04:35) (59 - 89)  BP: 152/73 (02 Jun 2021 04:35) (114/58 - 157/74)  BP(mean): --  ABP: --  ABP(mean): --  RR: 18 (02 Jun 2021 04:35) (18 - 18)  SpO2: 95% (02 Jun 2021 04:35) (93% - 95%)        89F sp R Paresh, progressing well.    Neuro: Pain control  Resp: IS  GI: Regular diet, bowel reg; FU TOV  MSK: WBAT, PT/OT  Heme: DVT PPX  
Ortho Progress Note    S: Patient seen and examined. No acute events overnight. Pain well controlled with current regimen. Denies lightheadedness/dizziness, CP/SOB. Tolerating diet.       O:  Physical Exam:  Gen: Laying in bed, NAD, alert and but confused.   Resp: Unlabored breathing  Ext: EHL/FHL/TA/Sol intact          + SILT DP/SP/FITZGERALD/Sa/Tib          +DP, extremity WWP    Vital Signs Last 24 Hrs  T(C): 36.9 (01 Jun 2021 04:44), Max: 36.9 (01 Jun 2021 04:44)  T(F): 98.4 (01 Jun 2021 04:44), Max: 98.4 (01 Jun 2021 04:44)  HR: 91 (01 Jun 2021 04:44) (55 - 102)  BP: 154/70 (01 Jun 2021 04:44) (129/69 - 184/74)  BP(mean): 95 (31 May 2021 19:00) (95 - 120)  RR: 18 (01 Jun 2021 04:44) (12 - 18)  SpO2: 94% (01 Jun 2021 04:44) (93% - 100%)                          12.2   13.81 )-----------( 196      ( 31 May 2021 15:46 )             38.3                         9.9    7.49  )-----------( 144      ( 31 May 2021 04:17 )             30.1       05-31    139  |  111<H>  |  26<H>  ----------------------------<  152<H>  5.6<H>   |  15<L>  |  0.87        PT/INR - ( 31 May 2021 04:17 )   PT: 15.0 sec;   INR: 1.26 ratio         PTT - ( 31 May 2021 04:17 )  PTT:28.9 sec        
  Patient comfortable  No complaints    T(C): 36.6 (06-03-21 @ 04:37), Max: 36.9 (06-02-21 @ 20:15)  HR: 59 (06-03-21 @ 04:37) (59 - 69)  BP: 145/56 (06-02-21 @ 20:15) (139/61 - 159/77)  RR: 18 (06-03-21 @ 04:37) (18 - 18)  SpO2: 95% (06-03-21 @ 04:37) (95% - 96%)      PHYSICAL EXAM:  NAD, Alert  Right Hip: Dressing C/D/I; Abduction pillow in place, dull sensation grossly intact to light touch; (+) DF/PF; (+) Distal Pulses; No Calf tenderness B/L, PAS     LABS:                        9.8    9.44  )-----------( 176      ( 02 Jun 2021 07:09 )             30.8     06-02    140  |  111<H>  |  30<H>  ----------------------------<  83  4.5   |  17<L>  |  1.08    Ca    8.8      02 Jun 2021 07:08            
           CARDIOLOGY     PROGRESS  NOTE   ________________________________________________    CHIEF COMPLAINT:Patient is a 89y old  Female who presents with a chief complaint of Right hip fracture (01 Jun 2021 09:01)  no  complain.  	  REVIEW OF SYSTEMS:  CONSTITUTIONAL: No fever, weight loss, or fatigue  EYES: No eye pain, visual disturbances, or discharge  ENT:  No difficulty hearing, tinnitus, vertigo; No sinus or throat pain  NECK: No pain or stiffness  RESPIRATORY: No cough, wheezing, chills or hemoptysis; No Shortness of Breath  CARDIOVASCULAR: No chest pain, palpitations, passing out, dizziness, or leg swelling  GASTROINTESTINAL: No abdominal or epigastric pain. No nausea, vomiting, or hematemesis; No diarrhea or constipation. No melena or hematochezia.  GENITOURINARY: No dysuria, frequency, hematuria, or incontinence  NEUROLOGICAL: No headaches, memory loss, loss of strength, numbness, or tremors  SKIN: No itching, burning, rashes, or lesions   LYMPH Nodes: No enlarged glands  ENDOCRINE: No heat or cold intolerance; No hair loss  MUSCULOSKELETAL: No joint pain or swelling; No muscle, back, or extremity pain  PSYCHIATRIC: No depression, anxiety, mood swings, or difficulty sleeping  HEME/LYMPH: No easy bruising, or bleeding gums  ALLERGY AND IMMUNOLOGIC: No hives or eczema	    [ ] All others negative	  [ ] Unable to obtain    PHYSICAL EXAM:  T(C): 36.6 (06-03-21 @ 04:37), Max: 36.9 (06-02-21 @ 20:15)  HR: 60 (06-03-21 @ 06:15) (59 - 69)  BP: 154/68 (06-03-21 @ 06:15) (139/61 - 159/77)  RR: 18 (06-03-21 @ 04:37) (18 - 18)  SpO2: 95% (06-03-21 @ 04:37) (95% - 96%)  Wt(kg): --  I&O's Summary    02 Jun 2021 07:01  -  03 Jun 2021 07:00  --------------------------------------------------------  IN: 420 mL / OUT: 1150 mL / NET: -730 mL        Appearance: Normal	  HEENT:   Normal oral mucosa, PERRL, EOMI	  Lymphatic: No lymphadenopathy  Cardiovascular: Normal S1 S2, No JVD, +murmurs, No edema  Respiratory: Lungs clear to auscultation	  Psychiatry: A & O x 3, Mood & affect appropriate  Gastrointestinal:  Soft, Non-tender, + BS	  Skin: No rashes, No ecchymoses, No cyanosis	  Neurologic: Non-focal  Extremities: Normal range of motion, No clubbing, cyanosis or edema  Vascular: Peripheral pulses palpable 2+ bilaterally    MEDICATIONS  (STANDING):  acetaminophen   Tablet .. 975 milliGRAM(s) Oral every 8 hours  aspirin enteric coated 81 milliGRAM(s) Oral two times a day  calcium carbonate 1250 mG  + Vitamin D (OsCal 500 + D) 1 Tablet(s) Oral daily  carvedilol 6.25 milliGRAM(s) Oral every 12 hours  cholecalciferol 2000 Unit(s) Oral daily  polyethylene glycol 3350 17 Gram(s) Oral daily  senna 2 Tablet(s) Oral at bedtime  sodium chloride 0.9% Bolus 500 milliLiter(s) IV Bolus once  sodium chloride 0.9%. 1000 milliLiter(s) (75 mL/Hr) IV Continuous <Continuous>      TELEMETRY: 	    ECG:  	  RADIOLOGY:  OTHER: 	  	  LABS:	 	    CARDIAC MARKERS:                                9.8    9.44  )-----------( 176      ( 02 Jun 2021 07:09 )             30.8     06-02    140  |  111<H>  |  30<H>  ----------------------------<  83  4.5   |  17<L>  |  1.08    Ca    8.8      02 Jun 2021 07:08      proBNP:   Lipid Profile:   HgA1c:   TSH:   DVT ppx- ASA 81mg PO BID  RLE WBAT  PT   DTV this am   Pain management prn  Discharge planning to [facility]      Assessment and plan  ---------------------------  89 y/o F dementia, htn p/w fall at nursing home x earlier today. as per patient, pt was walking and she tripped over carpet and fell onto right side. was down for multiple hours before being found by aides. patient does not endorse head trauma or LOC. refusing pain medication, pain is 10/10 over right hip, patient cannot move right hip  aaox2 at baseline, no cp, sob, fever, head trauma, LOC, blurry vision, vomiting, diarrhea, AC use  pt with known hx of chb s/ ppm.  pt with hx of dementia s/ ppm sec to chb , chest x ray no sign of chf   ppm has been checked already  nsvt on ppm interrogation before , continue beta  blocker, no clinical sign of chf  keep hgb>8  dvt prophylaxis  doing well post op  re start on coreg 6.25 mg bid  dvt prophylaxis  will adjust meds as needed  will increase coreg if bp remains elevated, pt has ppm    	        
Patient examined at bedside, currently getting potassium supplementation. C/o pain to arm where IV potassium being administered.     T(C): 36.8 (05-31-21 @ 06:40), Max: 37.1 (05-30-21 @ 18:40)  HR: 80 (05-31-21 @ 06:40) (22 - 80)  BP: 135/60 (05-31-21 @ 06:40) (131/105 - 192/64)  RR: 18 (05-31-21 @ 06:40) (17 - 60)  SpO2: 95% (05-31-21 @ 06:40) (93% - 99%)  Wt(kg): --    Exam:  Alert and Oriented, No Acute Distress  Lower Extremities: Right leg shortened, externally rotated  Calves Soft, Non-tender bilaterally  Toes warm, mobile, brisk capillary refill  SILT  +DP Pulse                          9.9    7.49  )-----------( 144      ( 31 May 2021 04:17 )             30.1    05-31    143  |  116<H>  |  20  ----------------------------<  104<H>  3.4<L>   |  17<L>  |  0.77    Ca    6.6<L>      31 May 2021 04:17    TPro  6.1  /  Alb  3.7  /  TBili  0.5  /  DBili  x   /  AST  20  /  ALT  17  /  AlkPhos  79  05-30    
89 y/o F dementia, htn p/w fall at nursing home x earlier today. as per patient, pt was walking and she tripped over carpet and fell onto right side. was down for multiple hours before being found by aides. patient does not endorse head trauma or LOC. refusing pain medication, pain is 10/10 over right hip, patient cannot move right hip. Patient was brought to [facility] for further treatment. Patient was found to have a right hip fx. Patient is s/p an Open reduction and internal fixation  of the right hip. Patient tolerated the procedure well. Pain has been well controlled and Patient is to start working with physical therapy. Patient scheduled for DC to rehab      MEDICATIONS  (STANDING):  acetaminophen   Tablet .. 975 milliGRAM(s) Oral every 8 hours  aspirin enteric coated 81 milliGRAM(s) Oral two times a day  calcium carbonate 1250 mG  + Vitamin D (OsCal 500 + D) 1 Tablet(s) Oral daily  carvedilol 6.25 milliGRAM(s) Oral every 12 hours  cholecalciferol 2000 Unit(s) Oral daily  polyethylene glycol 3350 17 Gram(s) Oral daily  senna 2 Tablet(s) Oral at bedtime  sodium chloride 0.45%. 1000 milliLiter(s) (50 mL/Hr) IV Continuous <Continuous>    MEDICATIONS  (PRN):  bisacodyl Suppository 10 milliGRAM(s) Rectal daily PRN If no bowel movement  magnesium hydroxide Suspension 30 milliLiter(s) Oral daily PRN Constipation  melatonin 3 milliGRAM(s) Oral at bedtime PRN Insomnia  ondansetron Injectable 4 milliGRAM(s) IV Push every 6 hours PRN Nausea and/or Vomiting  traMADol 25 milliGRAM(s) Oral every 4 hours PRN Moderate Pain (4 - 6)  traMADol 50 milliGRAM(s) Oral every 4 hours PRN Severe Pain (7 - 10)          VITALS:   T(C): 36.7 (21 @ 16:13), Max: 36.9 (21 @ 20:15)  HR: 60 (21 @ 16:13) (59 - 73)  BP: 159/68 (21 @ 16:13) (137/61 - 162/69)  RR: 17 (21 @ 16:13) (17 - 18)  SpO2: 95% (21 @ 16:13) (93% - 96%)  Wt(kg): --      PHYSICAL EXAM:  GENERAL: NAD, well-groomed, well-developed  HEAD:  Atraumatic, Normocephalic  EYES: EOMI, PERRLA, conjunctiva and sclera clear  ENMT: No tonsillar erythema, exudates, or enlargement; Moist mucous membranes, Good dentition, No lesions  NECK: Supple, No JVD, Normal thyroid  NERVOUS SYSTEM:  Alert & Oriented x1, Motor Strength 5/5 B/L upper and lower extremities; DTRs 2+ intact and symmetric  CHEST/LUNG: Clear to percussion bilaterally; No rales, rhonchi, wheezing, or rubs  HEART: Regular rate and rhythm; No murmurs, rubs, or gallops  ABDOMEN: Soft, Nontender, Nondistended; Bowel sounds present  EXTREMITIES:  shortening and external rotation of the right leg   LYMPH: No lymphadenopathy noted  SKIN: No rashes or lesions  LABS:        CBC Full  -  ( 2021 07:09 )  WBC Count : 9.44 K/uL  RBC Count : 3.51 M/uL  Hemoglobin : 9.8 g/dL  Hematocrit : 30.8 %  Platelet Count - Automated : 176 K/uL  Mean Cell Volume : 87.7 fl  Mean Cell Hemoglobin : 27.9 pg  Mean Cell Hemoglobin Concentration : 31.8 gm/dL  Auto Neutrophil # : x  Auto Lymphocyte # : x  Auto Monocyte # : x  Auto Eosinophil # : x  Auto Basophil # : x  Auto Neutrophil % : x  Auto Lymphocyte % : x  Auto Monocyte % : x  Auto Eosinophil % : x  Auto Basophil % : x    06-    140  |  111<H>  |  30<H>  ----------------------------<  83  4.5   |  17<L>  |  1.08    Ca    8.8      2021 07:08          Urinalysis Basic - ( 2021 13:10 )    Color: Light Yellow / Appearance: Clear / S.020 / pH: x  Gluc: x / Ketone: Trace  / Bili: Negative / Urobili: Negative   Blood: x / Protein: Negative / Nitrite: Negative   Leuk Esterase: Large / RBC: 2 /hpf / WBC 24 /HPF   Sq Epi: x / Non Sq Epi: 2 / Bacteria: Negative      CAPILLARY BLOOD GLUCOSE          RADIOLOGY & ADDITIONAL TESTS:      
89 y/o F dementia, htn p/w fall at nursing home x earlier today. as per patient, pt was walking and she tripped over carpet and fell onto right side. was down for multiple hours before being found by aides. patient does not endorse head trauma or LOC. refusing pain medication, pain is 10/10 over right hip, patient cannot move right hip. Patient was brought to [facility] for further treatment. Patient was found to have a right hip fx. Patient is s/p an Open reduction and internal fixation  of the right hip. Patient tolerated the procedure well. Pain has been well controlled and Patient is to start working with physical therapy       MEDICATIONS  (STANDING):  acetaminophen   Tablet .. 975 milliGRAM(s) Oral every 8 hours  aspirin enteric coated 81 milliGRAM(s) Oral two times a day  calcium carbonate 1250 mG  + Vitamin D (OsCal 500 + D) 1 Tablet(s) Oral daily  carvedilol 6.25 milliGRAM(s) Oral every 12 hours  cholecalciferol 2000 Unit(s) Oral daily  polyethylene glycol 3350 17 Gram(s) Oral daily  senna 2 Tablet(s) Oral at bedtime  sodium chloride 0.9% Bolus 500 milliLiter(s) IV Bolus once  sodium chloride 0.9%. 1000 milliLiter(s) (75 mL/Hr) IV Continuous <Continuous>    MEDICATIONS  (PRN):  bisacodyl Suppository 10 milliGRAM(s) Rectal daily PRN If no bowel movement  magnesium hydroxide Suspension 30 milliLiter(s) Oral daily PRN Constipation  melatonin 3 milliGRAM(s) Oral at bedtime PRN Insomnia  ondansetron Injectable 4 milliGRAM(s) IV Push every 6 hours PRN Nausea and/or Vomiting  traMADol 25 milliGRAM(s) Oral every 4 hours PRN Moderate Pain (4 - 6)  traMADol 50 milliGRAM(s) Oral every 4 hours PRN Severe Pain (7 - 10)          VITALS:   T(C): 36.4 (06-02-21 @ 13:06), Max: 36.9 (06-01-21 @ 16:00)  HR: 62 (06-02-21 @ 13:06) (59 - 71)  BP: 147/81 (06-02-21 @ 13:06) (114/58 - 152/73)  RR: 18 (06-02-21 @ 13:06) (18 - 18)  SpO2: 95% (06-02-21 @ 13:06) (93% - 95%)  Wt(kg): --      PHYSICAL EXAM:  GENERAL: NAD, well-groomed, well-developed  HEAD:  Atraumatic, Normocephalic  EYES: EOMI, PERRLA, conjunctiva and sclera clear  ENMT: No tonsillar erythema, exudates, or enlargement; Moist mucous membranes, Good dentition, No lesions  NECK: Supple, No JVD, Normal thyroid  NERVOUS SYSTEM:  Alert & Oriented x1, Motor Strength 5/5 B/L upper and lower extremities; DTRs 2+ intact and symmetric  CHEST/LUNG: Clear to percussion bilaterally; No rales, rhonchi, wheezing, or rubs  HEART: Regular rate and rhythm; No murmurs, rubs, or gallops  ABDOMEN: Soft, Nontender, Nondistended; Bowel sounds present  EXTREMITIES:  shortening and external rotation of the right leg   LYMPH: No lymphadenopathy noted  SKIN: No rashes or lesions      LABS:        CBC Full  -  ( 02 Jun 2021 07:09 )  WBC Count : 9.44 K/uL  RBC Count : 3.51 M/uL  Hemoglobin : 9.8 g/dL  Hematocrit : 30.8 %  Platelet Count - Automated : 176 K/uL  Mean Cell Volume : 87.7 fl  Mean Cell Hemoglobin : 27.9 pg  Mean Cell Hemoglobin Concentration : 31.8 gm/dL  Auto Neutrophil # : x  Auto Lymphocyte # : x  Auto Monocyte # : x  Auto Eosinophil # : x  Auto Basophil # : x  Auto Neutrophil % : x  Auto Lymphocyte % : x  Auto Monocyte % : x  Auto Eosinophil % : x  Auto Basophil % : x    06-02    140  |  111<H>  |  30<H>  ----------------------------<  83  4.5   |  17<L>  |  1.08    Ca    8.8      02 Jun 2021 07:08            CAPILLARY BLOOD GLUCOSE          RADIOLOGY & ADDITIONAL TESTS:      
89 y/o F dementia, htn p/w fall at nursing home x earlier today. as per patient, pt was walking and she tripped over carpet and fell onto right side. was down for multiple hours before being found by aides. patient does not endorse head trauma or LOC. refusing pain medication, pain is 10/10 over right hip, patient cannot move right hip. Patient was brought to [facility] for further treatment. Patient was found to have a right hip fx. Patient is s/p an Open reduction and internal fixation  of the right hip. Patient tolerated the procedure well . Seen post op    MEDICATIONS  (STANDING):  acetaminophen   Tablet .. 975 milliGRAM(s) Oral every 8 hours  aspirin enteric coated 81 milliGRAM(s) Oral two times a day  calcium carbonate 1250 mG  + Vitamin D (OsCal 500 + D) 1 Tablet(s) Oral daily  ceFAZolin   IVPB 2000 milliGRAM(s) IV Intermittent every 8 hours  cholecalciferol 2000 Unit(s) Oral daily  polyethylene glycol 3350 17 Gram(s) Oral daily  QUEtiapine 25 milliGRAM(s) Oral at bedtime  senna 2 Tablet(s) Oral at bedtime  sodium chloride 0.9% Bolus 500 milliLiter(s) IV Bolus once  sodium chloride 0.9%. 1000 milliLiter(s) (75 mL/Hr) IV Continuous <Continuous>    MEDICATIONS  (PRN):  magnesium hydroxide Suspension 30 milliLiter(s) Oral daily PRN Constipation  melatonin 3 milliGRAM(s) Oral at bedtime PRN Insomnia  ondansetron Injectable 4 milliGRAM(s) IV Push every 6 hours PRN Nausea and/or Vomiting  traMADol 25 milliGRAM(s) Oral every 4 hours PRN Moderate Pain (4 - 6)  traMADol 50 milliGRAM(s) Oral every 4 hours PRN Severe Pain (7 - 10)          VITALS:   T(C): 36.5 (05-31-21 @ 20:40), Max: 36.8 (05-31-21 @ 04:45)  HR: 102 (05-31-21 @ 23:55) (55 - 102)  BP: 184/74 (05-31-21 @ 23:55) (129/69 - 184/74)  RR: 18 (05-31-21 @ 20:40) (12 - 18)  SpO2: 94% (05-31-21 @ 20:40) (93% - 100%)  Wt(kg): --      PHYSICAL EXAM:  GENERAL: NAD, well-groomed, well-developed  HEAD:  Atraumatic, Normocephalic  EYES: EOMI, PERRLA, conjunctiva and sclera clear  ENMT: No tonsillar erythema, exudates, or enlargement; Moist mucous membranes, Good dentition, No lesions  NECK: Supple, No JVD, Normal thyroid  NERVOUS SYSTEM:  Alert & Oriented x1, Motor Strength 5/5 B/L upper and lower extremities; DTRs 2+ intact and symmetric  CHEST/LUNG: Clear to percussion bilaterally; No rales, rhonchi, wheezing, or rubs  HEART: Regular rate and rhythm; No murmurs, rubs, or gallops  ABDOMEN: Soft, Nontender, Nondistended; Bowel sounds present  EXTREMITIES:  shortening and external rotation of the right leg   LYMPH: No lymphadenopathy noted  SKIN: No rashes or lesions    LABS:    CARDIAC MARKERS ( 30 May 2021 14:11 )  x     / x     / 85 U/L / x     / x          CBC Full  -  ( 31 May 2021 15:46 )  WBC Count : 13.81 K/uL  RBC Count : 4.46 M/uL  Hemoglobin : 12.2 g/dL  Hematocrit : 38.3 %  Platelet Count - Automated : 196 K/uL  Mean Cell Volume : 85.9 fl  Mean Cell Hemoglobin : 27.4 pg  Mean Cell Hemoglobin Concentration : 31.9 gm/dL  Auto Neutrophil # : x  Auto Lymphocyte # : x  Auto Monocyte # : x  Auto Eosinophil # : x  Auto Basophil # : x  Auto Neutrophil % : x  Auto Lymphocyte % : x  Auto Monocyte % : x  Auto Eosinophil % : x  Auto Basophil % : x    05-31    139  |  111<H>  |  26<H>  ----------------------------<  152<H>  5.6<H>   |  15<L>  |  0.87    Ca    7.7<L>      31 May 2021 18:40    TPro  6.1  /  Alb  3.7  /  TBili  0.5  /  DBili  x   /  AST  20  /  ALT  17  /  AlkPhos  79  05-30    LIVER FUNCTIONS - ( 30 May 2021 14:11 )  Alb: 3.7 g/dL / Pro: 6.1 g/dL / ALK PHOS: 79 U/L / ALT: 17 U/L / AST: 20 U/L / GGT: x           PT/INR - ( 31 May 2021 04:17 )   PT: 15.0 sec;   INR: 1.26 ratio         PTT - ( 31 May 2021 04:17 )  PTT:28.9 sec    CAPILLARY BLOOD GLUCOSE          RADIOLOGY & ADDITIONAL TESTS:      
89 y/o F dementia, htn p/w fall at nursing home x earlier today. as per patient, pt was walking and she tripped over carpet and fell onto right side. was down for multiple hours before being found by aides. patient does not endorse head trauma or LOC. refusing pain medication, pain is 10/10 over right hip, patient cannot move right hip. Patient was brought to [facility] for further treatment. Patient was found to have a right hip fx. Patient is s/p an Open reduction and internal fixation  of the right hip. Patient tolerated the procedure well. Pain has been well controlled and Patient is to start working with physical therapy     MEDICATIONS  (STANDING):  acetaminophen   Tablet .. 975 milliGRAM(s) Oral every 8 hours  aspirin enteric coated 81 milliGRAM(s) Oral two times a day  calcium carbonate 1250 mG  + Vitamin D (OsCal 500 + D) 1 Tablet(s) Oral daily  carvedilol 6.25 milliGRAM(s) Oral every 12 hours  cholecalciferol 2000 Unit(s) Oral daily  polyethylene glycol 3350 17 Gram(s) Oral daily  QUEtiapine 25 milliGRAM(s) Oral at bedtime  senna 2 Tablet(s) Oral at bedtime  sodium chloride 0.9% Bolus 500 milliLiter(s) IV Bolus once  sodium chloride 0.9%. 1000 milliLiter(s) (75 mL/Hr) IV Continuous <Continuous>    MEDICATIONS  (PRN):  magnesium hydroxide Suspension 30 milliLiter(s) Oral daily PRN Constipation  melatonin 3 milliGRAM(s) Oral at bedtime PRN Insomnia  ondansetron Injectable 4 milliGRAM(s) IV Push every 6 hours PRN Nausea and/or Vomiting  traMADol 25 milliGRAM(s) Oral every 4 hours PRN Moderate Pain (4 - 6)  traMADol 50 milliGRAM(s) Oral every 4 hours PRN Severe Pain (7 - 10)          VITALS:   T(C): 36.9 (06-01-21 @ 16:00), Max: 36.9 (06-01-21 @ 04:44)  HR: 71 (06-01-21 @ 16:00) (55 - 102)  BP: 114/58 (06-01-21 @ 16:00) (114/58 - 184/74)  RR: 18 (06-01-21 @ 16:00) (12 - 18)  SpO2: 93% (06-01-21 @ 16:00) (93% - 100%)  Wt(kg): --      PHYSICAL EXAM:  GENERAL: NAD, well-groomed, well-developed  HEAD:  Atraumatic, Normocephalic  EYES: EOMI, PERRLA, conjunctiva and sclera clear  ENMT: No tonsillar erythema, exudates, or enlargement; Moist mucous membranes, Good dentition, No lesions  NECK: Supple, No JVD, Normal thyroid  NERVOUS SYSTEM:  Alert & Oriented x1, Motor Strength 5/5 B/L upper and lower extremities; DTRs 2+ intact and symmetric  CHEST/LUNG: Clear to percussion bilaterally; No rales, rhonchi, wheezing, or rubs  HEART: Regular rate and rhythm; No murmurs, rubs, or gallops  ABDOMEN: Soft, Nontender, Nondistended; Bowel sounds present  EXTREMITIES:  shortening and external rotation of the right leg   LYMPH: No lymphadenopathy noted  SKIN: No rashes or lesions    LABS:        CBC Full  -  ( 01 Jun 2021 07:09 )  WBC Count : 15.92 K/uL  RBC Count : 3.70 M/uL  Hemoglobin : 10.2 g/dL  Hematocrit : 32.1 %  Platelet Count - Automated : 226 K/uL  Mean Cell Volume : 86.8 fl  Mean Cell Hemoglobin : 27.6 pg  Mean Cell Hemoglobin Concentration : 31.8 gm/dL  Auto Neutrophil # : 13.75 K/uL  Auto Lymphocyte # : 0.78 K/uL  Auto Monocyte # : 1.29 K/uL  Auto Eosinophil # : 0.00 K/uL  Auto Basophil # : 0.01 K/uL  Auto Neutrophil % : 86.3 %  Auto Lymphocyte % : 4.9 %  Auto Monocyte % : 8.1 %  Auto Eosinophil % : 0.0 %  Auto Basophil % : 0.1 %    06-01    140  |  109<H>  |  30<H>  ----------------------------<  105<H>  5.3   |  15<L>  |  1.25    Ca    8.6      01 Jun 2021 07:08        PT/INR - ( 31 May 2021 04:17 )   PT: 15.0 sec;   INR: 1.26 ratio         PTT - ( 31 May 2021 04:17 )  PTT:28.9 sec    CAPILLARY BLOOD GLUCOSE          RADIOLOGY & ADDITIONAL TESTS:

## 2021-06-03 NOTE — PROGRESS NOTE ADULT - PROBLEM SELECTOR PLAN 1
Pain meds as needed  follow for oversedation  physical therapy as tolerated  PO as tolerated  GI regimen to prevent constipation  DVT prophylaxis
Pain meds as needed  physical therapy as tolerated  follow for oversedation  PO as tolerated  GI regimen to prevent constipation  DVT prophylaxis
PT/OT- Bedrest for OR  IS  DVT PPx - held for OR  NPO  IVF while NPO  Pain Control    Rebecca Montague PA-C  Team Pager: #[phone]
Pain meds as needed  follow for oversedation  physical therapy as tolerated  PO as tolerated  GI regimen to prevent constipation  DVT prophylaxis
Patient tolerated the procedure well  Pain meds as needed  follow for oversedation  PO as tolerated  GI regimen to prevent constipation  DVT prophylaxis

## 2021-06-03 NOTE — PROGRESS NOTE ADULT - PROBLEM SELECTOR PLAN 3
Interrogated by cardiology  continue to monitor rate

## 2021-06-03 NOTE — PROGRESS NOTE ADULT - PROBLEM SELECTOR PLAN 2
reorient Patient as needed  seroquel for agitation  Patient has a hx of anxiety, if needed could use a small dose of Xanax PRN
reorient Patient as needed  seroquel for agitation  Patient has a hx of anxiety, if needed could use a small dose of Xanax PRN
reorient Patient as needed  seroquel for agitation
reorient Patient as needed  seroquel for agitation  Patient has a hx of anxiety, if needed could use a small dose of Xanax PRN
Principal Discharge DX:	Pneumothorax on left  Goal:	recovery, follow up appointment, and return to daily activities  Assessment and plan of treatment:	Continue diet as usual and activity as usual   Please follow up with your primary pediatrician within 2 weeks after discharge; Please also follow up with Dr. Graham within 2 weeks after discharge

## 2021-06-03 NOTE — PROGRESS NOTE ADULT - PROBLEM SELECTOR PROBLEM 1
Femur fracture, right

## 2021-06-03 NOTE — PROGRESS NOTE ADULT - ASSESSMENT
89 yo woman with a hx of dementia presents after a fall at home with a right hip fx. Patient is S/P an ORIF of the right hip 
89F sp R Paresh    Neuro: Pain control  Resp: IS  GI: Regular diet, bowel reg  MSK: WBAT, PT/OT  Heme: DVT PPX    Ortho 1337/1409
A/P: 89 y/o F POD#3 s/p R Paresh    DVT ppx- ASA 81mg PO BID  RLE WBAT  PT   DTV this am   Pain management prn  Discharge planning to [facility]  Orthopedic Surgery  [phone]  
  A/p: Patient is a 89y Female with Right FN Fx, going for Right Hip Hemiarthroplasty.  VSS. NAD.    
89 yo woman with a hx of dementia presents after a fall at home with a right hip fx. Patient is S/P an ORIF of the right hip 

## 2021-06-03 NOTE — PROGRESS NOTE ADULT - TIME BILLING
Discussed treatment plan with patient and daughter at bedside .
Discussed treatment plan with patient and staff at bedside .
Discussed treatment plan with patient and staff at bedside .  DC planning to rehab
Discussed treatment plan with patient at bedside and daughter by phone .

## 2021-10-03 ENCOUNTER — APPOINTMENT (OUTPATIENT)
Dept: ELECTROPHYSIOLOGY | Facility: CLINIC | Age: 86
End: 2021-10-03
Payer: MEDICARE

## 2021-10-04 ENCOUNTER — NON-APPOINTMENT (OUTPATIENT)
Age: 86
End: 2021-10-04

## 2021-10-04 PROBLEM — Z95.0 PRESENCE OF CARDIAC PACEMAKER: Chronic | Status: ACTIVE | Noted: 2021-05-30

## 2021-10-04 PROBLEM — S72.009A FRACTURE OF UNSPECIFIED PART OF NECK OF UNSPECIFIED FEMUR, INITIAL ENCOUNTER FOR CLOSED FRACTURE: Chronic | Status: ACTIVE | Noted: 2021-05-30

## 2021-10-04 PROBLEM — C50.919 MALIGNANT NEOPLASM OF UNSPECIFIED SITE OF UNSPECIFIED FEMALE BREAST: Chronic | Status: ACTIVE | Noted: 2021-05-30

## 2021-10-04 PROCEDURE — 93296 REM INTERROG EVL PM/IDS: CPT

## 2021-10-04 PROCEDURE — 93294 REM INTERROG EVL PM/LDLS PM: CPT

## 2021-11-17 NOTE — ED STATDOCS - PATIENT PORTAL LINK FT
No You can access the FollowMyHealth Patient Portal offered by [facility] by registering at the following website: [email or web address]. By joining [employer]’s FollowMyHealth portal, you will also be able to view your health information using other applications (apps) compatible with our system.

## 2021-12-16 NOTE — ED PROVIDER NOTE - OBJECTIVE STATEMENT
slipping, stumbling. tripping 89 y/o female with no PCP, recently moved from [place] in October, PMHx of pacemaker, HTN not on BT/AC presents BIBEMS for several episodes of V/D. with nonbilious, nonbloody of emesis midnight last night and watery diarrhea without blood or mucous. +mild intermittent abd cramping. no pain in ED right now. denies sick contacts. no foreign travel. no CP, SOB. refusing medications in ED at this time.

## 2022-01-20 ENCOUNTER — APPOINTMENT (OUTPATIENT)
Dept: ELECTROPHYSIOLOGY | Facility: CLINIC | Age: 87
End: 2022-01-20
Payer: MEDICARE

## 2022-01-21 ENCOUNTER — NON-APPOINTMENT (OUTPATIENT)
Age: 87
End: 2022-01-21

## 2022-01-21 PROCEDURE — 93296 REM INTERROG EVL PM/IDS: CPT

## 2022-01-21 PROCEDURE — 93294 REM INTERROG EVL PM/LDLS PM: CPT

## 2022-02-06 ENCOUNTER — EMERGENCY (EMERGENCY)
Facility: HOSPITAL | Age: 87
LOS: 0 days | Discharge: HOME | End: 2022-02-06
Attending: EMERGENCY MEDICINE
Payer: MEDICARE

## 2022-02-06 VITALS
SYSTOLIC BLOOD PRESSURE: 167 MMHG | OXYGEN SATURATION: 99 % | DIASTOLIC BLOOD PRESSURE: 72 MMHG | RESPIRATION RATE: 18 BRPM | HEART RATE: 67 BPM | TEMPERATURE: 99 F

## 2022-02-06 VITALS
DIASTOLIC BLOOD PRESSURE: 80 MMHG | SYSTOLIC BLOOD PRESSURE: 175 MMHG | RESPIRATION RATE: 17 BRPM | HEIGHT: 62 IN | WEIGHT: 139.99 LBS | HEART RATE: 60 BPM | OXYGEN SATURATION: 95 % | TEMPERATURE: 98 F

## 2022-02-06 DIAGNOSIS — S09.90XA UNSPECIFIED INJURY OF HEAD, INITIAL ENCOUNTER: ICD-10-CM

## 2022-02-06 DIAGNOSIS — Z88.0 ALLERGY STATUS TO PENICILLIN: ICD-10-CM

## 2022-02-06 DIAGNOSIS — F03.90 UNSPECIFIED DEMENTIA WITHOUT BEHAVIORAL DISTURBANCE: ICD-10-CM

## 2022-02-06 DIAGNOSIS — Z88.6 ALLERGY STATUS TO ANALGESIC AGENT: ICD-10-CM

## 2022-02-06 DIAGNOSIS — I10 ESSENTIAL (PRIMARY) HYPERTENSION: ICD-10-CM

## 2022-02-06 DIAGNOSIS — Z90.12 ACQUIRED ABSENCE OF LEFT BREAST AND NIPPLE: Chronic | ICD-10-CM
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
## 2022-02-06 DIAGNOSIS — Z88.2 ALLERGY STATUS TO SULFONAMIDES: ICD-10-CM

## 2022-02-06 PROCEDURE — 70450 CT HEAD/BRAIN W/O DYE: CPT | Mod: 26,MA

## 2022-02-06 PROCEDURE — 72125 CT NECK SPINE W/O DYE: CPT | Mod: MA

## 2022-02-06 PROCEDURE — 99284 EMERGENCY DEPT VISIT MOD MDM: CPT | Mod: 25

## 2022-02-06 PROCEDURE — 70450 CT HEAD/BRAIN W/O DYE: CPT | Mod: MA

## 2022-02-06 PROCEDURE — 99285 EMERGENCY DEPT VISIT HI MDM: CPT

## 2022-02-06 PROCEDURE — 72125 CT NECK SPINE W/O DYE: CPT | Mod: 26,MA

## 2022-02-06 RX ORDER — ALPRAZOLAM 0.25 MG
0.5 TABLET ORAL ONCE
Refills: 0 | Status: DISCONTINUED | OUTPATIENT
Start: 2022-02-06 | End: 2022-02-06

## 2022-02-06 RX ADMIN — Medication 0.5 MILLIGRAM(S): at 21:30

## 2022-02-06 NOTE — ED PROVIDER NOTE - CLINICAL SUMMARY MEDICAL DECISION MAKING FREE TEXT BOX
CTs negative for acute trauma.  No other signs of trauma on exam.  VS WNL, no focal signs of medical disease on this visit.  She is at her mental baseline.  D/c back to facility.

## 2022-02-06 NOTE — ED PROVIDER NOTE - PROGRESS NOTE DETAILS
Patient sundowning, anxious; due for Xanax 0.5 mg PO according to MAR.  Will medicate while waiting for transport. Devon Beverly D.O.

## 2022-02-06 NOTE — ED PROVIDER NOTE - PATIENT PORTAL LINK FT
You can access the FollowMyHealth Patient Portal offered by [facility] by registering at the following website: [email or web address]. By joining [employer]’s FollowMyHealth portal, you will also be able to view your health information using other applications (apps) compatible with our system.

## 2022-02-06 NOTE — ED ADULT TRIAGE NOTE - CHIEF COMPLAINT QUOTE
pt s/p fall from [place]. +head strike, on ASA. denies pain. neuro alert called in triage. hx- dementia.

## 2022-02-06 NOTE — ED ADULT NURSE NOTE - NSIMPLEMENTINTERV_GEN_ALL_ED
Implemented All Fall with Harm Risk Interventions:  [place] to call system. Call bell, personal items and telephone within reach. Instruct patient to call for assistance. Room bathroom lighting operational. Non-slip footwear when patient is off stretcher. Physically safe environment: no spills, clutter or unnecessary equipment. Stretcher in lowest position, wheels locked, appropriate side rails in place. Provide visual cue, wrist band, yellow gown, etc. Monitor gait and stability. Monitor for mental status changes and reorient to person, place, and time. Review medications for side effects contributing to fall risk. Reinforce activity limits and safety measures with patient and family. Provide visual clues: red socks.

## 2022-02-06 NOTE — ED PROVIDER NOTE - MUSCULOSKELETAL, MLM
Spine appears normal, range of motion is not limited, no muscle or joint tenderness. No signs of bodily trauma.

## 2022-02-06 NOTE — ED PROVIDER NOTE - NSICDXPASTMEDICALHX_GEN_ALL_CORE_FT
PAST MEDICAL HISTORY:  Breast cancer     Hip fx right    HTN (hypertension)     Pacemaker     Pacemaker       Dementia

## 2022-02-06 NOTE — ED ADULT NURSE NOTE - OBJECTIVE STATEMENT
pt presents to er for evaluation of headstrike after fall at nursing home. pt w hx dementia unable to provide reliable history. oriented to self. pt takes asa 81 daily. neuro alert called and pt brought directly to CT scan for imaging.

## 2022-08-02 ENCOUNTER — APPOINTMENT (OUTPATIENT)
Dept: ELECTROPHYSIOLOGY | Facility: CLINIC | Age: 87
End: 2022-08-02

## 2022-08-03 ENCOUNTER — NON-APPOINTMENT (OUTPATIENT)
Age: 87
End: 2022-08-03

## 2022-08-03 PROCEDURE — 93296 REM INTERROG EVL PM/IDS: CPT

## 2022-08-03 PROCEDURE — 93294 REM INTERROG EVL PM/LDLS PM: CPT

## 2022-08-05 PROBLEM — F03.90 UNSPECIFIED DEMENTIA WITHOUT BEHAVIORAL DISTURBANCE: Chronic | Status: ACTIVE | Noted: 2022-02-06

## 2022-09-27 NOTE — PROCEDURE NOTE - INTERROGATION NOTE: R-WAVE AMPLITUDE (MV)
R nephrolithiasis / UTI  Resolved / Improving  Admitted to [facility] on 7/8/22 for unremitting right flank pain due to right kidney stone  s/p right URS, lithrotripsy and ureteral stent placement (7/8/22 - Dr Connelly)  S/p Denson removal (7/12/22)  s/p stent removal (7/20/22)  + UTI post-procedure.  on 7/22/22 started on Abx x 2 weeks. UA +RBCs  s/p Tamsulosin and Norco  Stone analysis on record   was following (Dr. Holman), LV: 9/13/22, f/u prn   21

## 2022-11-02 ENCOUNTER — APPOINTMENT (OUTPATIENT)
Dept: ELECTROPHYSIOLOGY | Facility: CLINIC | Age: 87
End: 2022-11-02

## 2022-11-03 ENCOUNTER — FORM ENCOUNTER (OUTPATIENT)
Age: 87
End: 2022-11-03

## 2022-11-14 ENCOUNTER — APPOINTMENT (OUTPATIENT)
Dept: ELECTROPHYSIOLOGY | Facility: CLINIC | Age: 87
End: 2022-11-14

## 2022-11-15 ENCOUNTER — NON-APPOINTMENT (OUTPATIENT)
Age: 87
End: 2022-11-15

## 2022-11-15 PROCEDURE — 93294 REM INTERROG EVL PM/LDLS PM: CPT

## 2022-11-15 PROCEDURE — 93296 REM INTERROG EVL PM/IDS: CPT

## 2023-02-15 ENCOUNTER — APPOINTMENT (OUTPATIENT)
Dept: ELECTROPHYSIOLOGY | Facility: CLINIC | Age: 88
End: 2023-02-15
Payer: OTHER MISCELLANEOUS

## 2023-02-16 ENCOUNTER — NON-APPOINTMENT (OUTPATIENT)
Age: 88
End: 2023-02-16

## 2023-02-16 PROCEDURE — 93296 REM INTERROG EVL PM/IDS: CPT

## 2023-02-16 PROCEDURE — 93294 REM INTERROG EVL PM/LDLS PM: CPT

## 2024-03-01 NOTE — PROGRESS NOTE ADULT - PROVIDER SPECIALTY LIST ADULT
Cardiology
Cardiology
Orthopedics
Cardiology
Internal Medicine
Orthopedics
Internal Medicine
Yes